# Patient Record
Sex: MALE | Race: WHITE | NOT HISPANIC OR LATINO
[De-identification: names, ages, dates, MRNs, and addresses within clinical notes are randomized per-mention and may not be internally consistent; named-entity substitution may affect disease eponyms.]

---

## 2017-01-06 ENCOUNTER — APPOINTMENT (OUTPATIENT)
Dept: CARDIOLOGY | Facility: CLINIC | Age: 82
End: 2017-01-06

## 2017-01-06 ENCOUNTER — NON-APPOINTMENT (OUTPATIENT)
Age: 82
End: 2017-01-06

## 2017-01-06 VITALS
DIASTOLIC BLOOD PRESSURE: 52 MMHG | HEART RATE: 84 BPM | TEMPERATURE: 97.4 F | HEIGHT: 67 IN | WEIGHT: 140 LBS | BODY MASS INDEX: 21.97 KG/M2 | SYSTOLIC BLOOD PRESSURE: 87 MMHG | OXYGEN SATURATION: 99 %

## 2017-01-06 DIAGNOSIS — Z87.09 PERSONAL HISTORY OF OTHER DISEASES OF THE RESPIRATORY SYSTEM: ICD-10-CM

## 2017-01-16 ENCOUNTER — NON-APPOINTMENT (OUTPATIENT)
Age: 82
End: 2017-01-16

## 2017-01-16 RX ORDER — LEVOFLOXACIN 500 MG/1
500 TABLET, FILM COATED ORAL DAILY
Qty: 5 | Refills: 1 | Status: DISCONTINUED | COMMUNITY
Start: 2017-01-06 | End: 2017-01-16

## 2017-03-06 ENCOUNTER — NON-APPOINTMENT (OUTPATIENT)
Age: 82
End: 2017-03-06

## 2017-03-06 ENCOUNTER — APPOINTMENT (OUTPATIENT)
Dept: CARDIOLOGY | Facility: CLINIC | Age: 82
End: 2017-03-06

## 2017-03-06 VITALS
SYSTOLIC BLOOD PRESSURE: 100 MMHG | BODY MASS INDEX: 22.96 KG/M2 | OXYGEN SATURATION: 100 % | WEIGHT: 146.3 LBS | DIASTOLIC BLOOD PRESSURE: 62 MMHG | HEIGHT: 67 IN

## 2017-03-06 VITALS — HEART RATE: 106 BPM

## 2017-03-07 ENCOUNTER — NON-APPOINTMENT (OUTPATIENT)
Age: 82
End: 2017-03-07

## 2017-03-28 ENCOUNTER — APPOINTMENT (OUTPATIENT)
Dept: CARDIOLOGY | Facility: CLINIC | Age: 82
End: 2017-03-28

## 2017-03-28 VITALS
DIASTOLIC BLOOD PRESSURE: 57 MMHG | BODY MASS INDEX: 22.91 KG/M2 | SYSTOLIC BLOOD PRESSURE: 89 MMHG | WEIGHT: 146 LBS | OXYGEN SATURATION: 100 % | HEIGHT: 67 IN | HEART RATE: 126 BPM

## 2017-03-29 ENCOUNTER — INPATIENT (INPATIENT)
Facility: HOSPITAL | Age: 82
LOS: 4 days | Discharge: HOME | DRG: 291 | End: 2017-04-03
Attending: INTERNAL MEDICINE | Admitting: INTERNAL MEDICINE
Payer: MEDICARE

## 2017-03-29 VITALS
SYSTOLIC BLOOD PRESSURE: 77 MMHG | HEART RATE: 111 BPM | DIASTOLIC BLOOD PRESSURE: 52 MMHG | RESPIRATION RATE: 22 BRPM | OXYGEN SATURATION: 100 % | TEMPERATURE: 97 F

## 2017-03-29 DIAGNOSIS — Z71.89 OTHER SPECIFIED COUNSELING: ICD-10-CM

## 2017-03-29 DIAGNOSIS — R06.09 OTHER FORMS OF DYSPNEA: ICD-10-CM

## 2017-03-29 DIAGNOSIS — I25.10 ATHEROSCLEROTIC HEART DISEASE OF NATIVE CORONARY ARTERY WITHOUT ANGINA PECTORIS: ICD-10-CM

## 2017-03-29 DIAGNOSIS — N17.9 ACUTE KIDNEY FAILURE, UNSPECIFIED: ICD-10-CM

## 2017-03-29 DIAGNOSIS — K56.69 OTHER INTESTINAL OBSTRUCTION: Chronic | ICD-10-CM

## 2017-03-29 DIAGNOSIS — D64.9 ANEMIA, UNSPECIFIED: ICD-10-CM

## 2017-03-29 DIAGNOSIS — R53.1 WEAKNESS: ICD-10-CM

## 2017-03-29 DIAGNOSIS — I48.2 CHRONIC ATRIAL FIBRILLATION: ICD-10-CM

## 2017-03-29 DIAGNOSIS — E03.9 HYPOTHYROIDISM, UNSPECIFIED: ICD-10-CM

## 2017-03-29 DIAGNOSIS — I50.9 HEART FAILURE, UNSPECIFIED: ICD-10-CM

## 2017-03-29 LAB
ALBUMIN SERPL ELPH-MCNC: 3.5 G/DL — SIGNIFICANT CHANGE UP (ref 3.3–5)
ALP SERPL-CCNC: 54 U/L — SIGNIFICANT CHANGE UP (ref 40–120)
ALT FLD-CCNC: 11 U/L RC — SIGNIFICANT CHANGE UP (ref 10–45)
ANION GAP SERPL CALC-SCNC: 15 MMOL/L — SIGNIFICANT CHANGE UP (ref 5–17)
APTT BLD: 43.9 SEC — HIGH (ref 27.5–37.4)
AST SERPL-CCNC: 25 U/L — SIGNIFICANT CHANGE UP (ref 10–40)
BASOPHILS # BLD AUTO: 0 K/UL — SIGNIFICANT CHANGE UP (ref 0–0.2)
BASOPHILS NFR BLD AUTO: 0.3 % — SIGNIFICANT CHANGE UP (ref 0–2)
BILIRUB SERPL-MCNC: 0.3 MG/DL — SIGNIFICANT CHANGE UP (ref 0.2–1.2)
BUN SERPL-MCNC: 82 MG/DL — HIGH (ref 7–23)
CALCIUM SERPL-MCNC: 8.4 MG/DL — SIGNIFICANT CHANGE UP (ref 8.4–10.5)
CHLORIDE SERPL-SCNC: 106 MMOL/L — SIGNIFICANT CHANGE UP (ref 96–108)
CK MB BLD-MCNC: 4.3 % — HIGH (ref 0–3.5)
CK MB CFR SERPL CALC: 4.9 NG/ML — SIGNIFICANT CHANGE UP (ref 0–6.7)
CK SERPL-CCNC: 113 U/L — SIGNIFICANT CHANGE UP (ref 30–200)
CO2 SERPL-SCNC: 22 MMOL/L — SIGNIFICANT CHANGE UP (ref 22–31)
CREAT SERPL-MCNC: 2.2 MG/DL — HIGH (ref 0.5–1.3)
EOSINOPHIL # BLD AUTO: 0 K/UL — SIGNIFICANT CHANGE UP (ref 0–0.5)
EOSINOPHIL NFR BLD AUTO: 0 % — SIGNIFICANT CHANGE UP (ref 0–6)
GAS PNL BLDV: SIGNIFICANT CHANGE UP
GLUCOSE SERPL-MCNC: 109 MG/DL — HIGH (ref 70–99)
HCT VFR BLD CALC: 21.2 % — LOW (ref 39–50)
HCT VFR BLD CALC: 22.6 % — LOW (ref 39–50)
HGB BLD-MCNC: 7.1 G/DL — LOW (ref 13–17)
HGB BLD-MCNC: 7.7 G/DL — LOW (ref 13–17)
INR BLD: 3.18 RATIO — HIGH (ref 0.88–1.16)
LYMPHOCYTES # BLD AUTO: 2 K/UL — SIGNIFICANT CHANGE UP (ref 1–3.3)
LYMPHOCYTES # BLD AUTO: 27.4 % — SIGNIFICANT CHANGE UP (ref 13–44)
MCHC RBC-ENTMCNC: 33.5 GM/DL — SIGNIFICANT CHANGE UP (ref 32–36)
MCHC RBC-ENTMCNC: 33.9 PG — SIGNIFICANT CHANGE UP (ref 27–34)
MCHC RBC-ENTMCNC: 34 GM/DL — SIGNIFICANT CHANGE UP (ref 32–36)
MCHC RBC-ENTMCNC: 34.1 PG — HIGH (ref 27–34)
MCV RBC AUTO: 100 FL — SIGNIFICANT CHANGE UP (ref 80–100)
MCV RBC AUTO: 101 FL — HIGH (ref 80–100)
MONOCYTES # BLD AUTO: 0.5 K/UL — SIGNIFICANT CHANGE UP (ref 0–0.9)
MONOCYTES NFR BLD AUTO: 7.3 % — SIGNIFICANT CHANGE UP (ref 2–14)
NEUTROPHILS # BLD AUTO: 4.6 K/UL — SIGNIFICANT CHANGE UP (ref 1.8–7.4)
NEUTROPHILS NFR BLD AUTO: 64.9 % — SIGNIFICANT CHANGE UP (ref 43–77)
NT-PROBNP SERPL-SCNC: 4975 PG/ML — HIGH (ref 0–300)
PLATELET # BLD AUTO: 169 K/UL — SIGNIFICANT CHANGE UP (ref 150–400)
PLATELET # BLD AUTO: 180 K/UL — SIGNIFICANT CHANGE UP (ref 150–400)
POTASSIUM SERPL-MCNC: 3.8 MMOL/L — SIGNIFICANT CHANGE UP (ref 3.5–5.3)
POTASSIUM SERPL-SCNC: 3.8 MMOL/L — SIGNIFICANT CHANGE UP (ref 3.5–5.3)
PROT SERPL-MCNC: 5.8 G/DL — LOW (ref 6–8.3)
PROTHROM AB SERPL-ACNC: 35.2 SEC — HIGH (ref 9.8–12.7)
RBC # BLD: 2.1 M/UL — LOW (ref 4.2–5.8)
RBC # BLD: 2.25 M/UL — LOW (ref 4.2–5.8)
RBC # FLD: 16.1 % — HIGH (ref 10.3–14.5)
RBC # FLD: 16.2 % — HIGH (ref 10.3–14.5)
SODIUM SERPL-SCNC: 143 MMOL/L — SIGNIFICANT CHANGE UP (ref 135–145)
TROPONIN T SERPL-MCNC: 0.06 NG/ML — SIGNIFICANT CHANGE UP (ref 0–0.06)
WBC # BLD: 6.4 K/UL — SIGNIFICANT CHANGE UP (ref 3.8–10.5)
WBC # BLD: 7.2 K/UL — SIGNIFICANT CHANGE UP (ref 3.8–10.5)
WBC # FLD AUTO: 6.4 K/UL — SIGNIFICANT CHANGE UP (ref 3.8–10.5)
WBC # FLD AUTO: 7.2 K/UL — SIGNIFICANT CHANGE UP (ref 3.8–10.5)

## 2017-03-29 PROCEDURE — 99223 1ST HOSP IP/OBS HIGH 75: CPT

## 2017-03-29 PROCEDURE — 93010 ELECTROCARDIOGRAM REPORT: CPT

## 2017-03-29 PROCEDURE — 71010: CPT | Mod: 26

## 2017-03-29 PROCEDURE — 99285 EMERGENCY DEPT VISIT HI MDM: CPT | Mod: 25,GC

## 2017-03-29 RX ORDER — RIVAROXABAN 15 MG-20MG
0 KIT ORAL
Qty: 0 | Refills: 0 | COMMUNITY

## 2017-03-29 RX ORDER — SIMVASTATIN 20 MG/1
20 TABLET, FILM COATED ORAL AT BEDTIME
Qty: 0 | Refills: 0 | Status: DISCONTINUED | OUTPATIENT
Start: 2017-03-29 | End: 2017-04-03

## 2017-03-29 RX ORDER — AZITHROMYCIN 500 MG/1
500 TABLET, FILM COATED ORAL ONCE
Qty: 0 | Refills: 0 | Status: DISCONTINUED | OUTPATIENT
Start: 2017-03-29 | End: 2017-03-29

## 2017-03-29 RX ORDER — SODIUM CHLORIDE 9 MG/ML
500 INJECTION INTRAMUSCULAR; INTRAVENOUS; SUBCUTANEOUS
Qty: 0 | Refills: 0 | Status: DISCONTINUED | OUTPATIENT
Start: 2017-03-29 | End: 2017-03-29

## 2017-03-29 RX ORDER — SIMVASTATIN 20 MG/1
0 TABLET, FILM COATED ORAL
Qty: 0 | Refills: 0 | COMMUNITY

## 2017-03-29 RX ORDER — FUROSEMIDE 40 MG
1 TABLET ORAL
Qty: 0 | Refills: 0 | COMMUNITY

## 2017-03-29 RX ORDER — CARVEDILOL PHOSPHATE 80 MG/1
0 CAPSULE, EXTENDED RELEASE ORAL
Qty: 0 | Refills: 0 | COMMUNITY

## 2017-03-29 RX ORDER — FINASTERIDE 5 MG/1
0 TABLET, FILM COATED ORAL
Qty: 0 | Refills: 0 | COMMUNITY

## 2017-03-29 RX ORDER — LEVOTHYROXINE SODIUM 125 MCG
1 TABLET ORAL
Qty: 0 | Refills: 0 | COMMUNITY

## 2017-03-29 RX ORDER — LEVOTHYROXINE SODIUM 125 MCG
0 TABLET ORAL
Qty: 0 | Refills: 0 | COMMUNITY

## 2017-03-29 RX ORDER — FUROSEMIDE 40 MG
2.5 TABLET ORAL
Qty: 500 | Refills: 0 | Status: DISCONTINUED | OUTPATIENT
Start: 2017-03-29 | End: 2017-03-30

## 2017-03-29 RX ORDER — FUROSEMIDE 40 MG
60 TABLET ORAL ONCE
Qty: 0 | Refills: 0 | Status: DISCONTINUED | OUTPATIENT
Start: 2017-03-29 | End: 2017-03-29

## 2017-03-29 RX ORDER — VALSARTAN 80 MG/1
0 TABLET ORAL
Qty: 0 | Refills: 0 | COMMUNITY

## 2017-03-29 RX ORDER — LEVOTHYROXINE SODIUM 125 MCG
150 TABLET ORAL DAILY
Qty: 0 | Refills: 0 | Status: DISCONTINUED | OUTPATIENT
Start: 2017-03-29 | End: 2017-04-03

## 2017-03-29 RX ORDER — VALSARTAN 80 MG/1
1 TABLET ORAL
Qty: 0 | Refills: 0 | COMMUNITY

## 2017-03-29 RX ORDER — FUROSEMIDE 40 MG
40 TABLET ORAL ONCE
Qty: 0 | Refills: 0 | Status: COMPLETED | OUTPATIENT
Start: 2017-03-29 | End: 2017-03-29

## 2017-03-29 RX ORDER — FUROSEMIDE 40 MG
40 TABLET ORAL
Qty: 0 | Refills: 0 | Status: DISCONTINUED | OUTPATIENT
Start: 2017-03-29 | End: 2017-03-29

## 2017-03-29 RX ORDER — CARVEDILOL PHOSPHATE 80 MG/1
3.12 CAPSULE, EXTENDED RELEASE ORAL EVERY 12 HOURS
Qty: 0 | Refills: 0 | Status: DISCONTINUED | OUTPATIENT
Start: 2017-03-29 | End: 2017-03-29

## 2017-03-29 RX ORDER — SIMVASTATIN 20 MG/1
1 TABLET, FILM COATED ORAL
Qty: 0 | Refills: 0 | COMMUNITY

## 2017-03-29 RX ORDER — SODIUM CHLORIDE 9 MG/ML
3 INJECTION INTRAMUSCULAR; INTRAVENOUS; SUBCUTANEOUS ONCE
Qty: 0 | Refills: 0 | Status: COMPLETED | OUTPATIENT
Start: 2017-03-29 | End: 2017-03-29

## 2017-03-29 RX ORDER — CEFTRIAXONE 500 MG/1
1 INJECTION, POWDER, FOR SOLUTION INTRAMUSCULAR; INTRAVENOUS ONCE
Qty: 0 | Refills: 0 | Status: COMPLETED | OUTPATIENT
Start: 2017-03-29 | End: 2017-03-29

## 2017-03-29 RX ADMIN — CEFTRIAXONE 100 GRAM(S): 500 INJECTION, POWDER, FOR SOLUTION INTRAMUSCULAR; INTRAVENOUS at 17:13

## 2017-03-29 RX ADMIN — Medication 40 MILLIGRAM(S): at 14:53

## 2017-03-29 RX ADMIN — SODIUM CHLORIDE 3 MILLILITER(S): 9 INJECTION INTRAMUSCULAR; INTRAVENOUS; SUBCUTANEOUS at 14:43

## 2017-03-29 NOTE — ED ADULT NURSE NOTE - PMH
Chronic atrial fibrillation    Chronic kidney disease, unspecified stage    Chronic systolic congestive heart failure    Coronary artery disease involving native coronary artery of native heart without angina pectoris    Essential hypertension    Hyperlipidemia, unspecified hyperlipidemia type    Hypothyroidism, unspecified type

## 2017-03-29 NOTE — H&P ADULT. - PROBLEM SELECTOR PLAN 3
last recorded Cr of 1.73 from 12/2016. currently with NEIDA on CKD- likely secondary to poor perfusion/forward flow 2/2 ADHF. will continue IV diuresis and monitor Cr closely  -check UA  -renally dose meds, avoid nephrotoxins  -monitor Cr closely with diuresis suspect 2/2 ADHF, anemia.Work up for both as above. Unlikely 2/2 hypothryodisim. will check TSH to assess for compliance of synthroid.   -PT consult  -nutrition consult suspect 2/2 ADHF, anemia.Work up for both as above. Unlikely 2/2 hypothryodisim. will check TSH to assess for compliance of synthroid.   -PT consult  -nutrition consult  -low suspicion for PNA- stop antibiotics at this time. (no fever, no cough, no leukocytosis)

## 2017-03-29 NOTE — ED PROVIDER NOTE - MEDICAL DECISION MAKING DETAILS
[age] yo male presents with sob worse with exertion; denies cough; hx of chf on furosemide; on exam decreased bs at bases, 2+ bilateral pitting edema; concern for chf exacerbation; r/o infection, pleural effusion, pna; Plan: cxr, labs, ekg, bnp, vbg, admission

## 2017-03-29 NOTE — ED PROVIDER NOTE - OBJECTIVE STATEMENT
89 y/o M w/ Hx of CHF, HTN, HLD, on xarelto (not sure why), p/w SOB.   PMHx: CHF, HTN, HLD, Hypothyroid  primary care physician: Benjamin Serrano 89 y/o M w/ Hx of CHF, HTN, HLD, on xarelto (not sure why), p/w SOB.   PMHx: CHF, HTN, HLD, Hypothyroid  PSHx: SBO  primary care physician: Benjamin Serrano 89 y/o M w/ Hx of CHF, HTN, HLD, on xarelto (not sure why), p/w worsening LAZO over the last 2 months. Denies CP, cough, fever, n/v/d.   PMHx: CHF, HTN, HLD, Hypothyroid  PSHx: SBO  primary care physician: Benjamin Serrano

## 2017-03-29 NOTE — H&P ADULT. - PROBLEM SELECTOR PLAN 8
discussed GOC with patient-- has never thought about DNR/DNI before. He currently would like everything done and remain full code. He reports his oldest daughter will likely be his HCP-- she is coming to hospital tonight vs tomorrow AM.

## 2017-03-29 NOTE — H&P ADULT. - PROBLEM SELECTOR PLAN 5
currently rate controlled 100-110. Will continue home meds for rate control with holding parameters. holding anticoagulation at this time  -diovan 160mg daily  -coreg 3/125mg q12h on synthroid. will check TSH to assess for compliance

## 2017-03-29 NOTE — ED ADULT NURSE NOTE - CHPI ED SYMPTOMS NEG
no vomiting/no nausea/no diaphoresis/no back pain/no chills/no cough/no fever/no syncope/no dizziness

## 2017-03-29 NOTE — H&P ADULT. - PROBLEM SELECTOR PLAN 7
discussed GOC with patient-- has never thought about DNR/DNI before. He currently would like everything done and remain full code. He reports his oldest daughter will likely be his HCP-- she is coming to hospital tonight vs tomorrow AM. no chest pain, stable currently. continue lipitor, will hold aspirin at this time given signifcant anemia

## 2017-03-29 NOTE — H&P ADULT. - EXTREMITIES COMMENTS
4+ pitting edema  extremities warm no cyanosis  small ulcerations of lateral shins  wrapped in DSD.  no surrounding erythema, or signs of infection

## 2017-03-29 NOTE — H&P ADULT. - PROBLEM SELECTOR PROBLEM 7
Advanced care planning/counseling discussion Coronary artery disease involving native coronary artery of native heart without angina pectoris

## 2017-03-29 NOTE — ED ADULT NURSE NOTE - OBJECTIVE STATEMENT
Per pt and pt's neighbor's report, pt has had sob for the past two month.  He has pitting edema up to his hips and weeping from the edema in his ankles.  Pt is A&Ox4, skin is warm and dry, and pt is able to speak in full sentences but becomes sob with any exertion.  Pt denies cp, dizziness, lightheadedness, nausea, vomiting, fever, or chills.

## 2017-03-29 NOTE — ED ADULT NURSE REASSESSMENT NOTE - NS ED NURSE REASSESS COMMENT FT1
Pt is a&ox4, waiting for room disposition, no reports of sob at this time, IV site clear no redness or swelling.

## 2017-03-29 NOTE — H&P ADULT. - PROBLEM SELECTOR PLAN 6
no chest pain, stable currently. continue lipitor, will hold aspirin at this time given signifcant anemia currently rate controlled 100-110. Will continue home meds for rate control with holding parameters. holding anticoagulation at this time  -diovan 160mg daily  -coreg 3/125mg q12h currently rate controlled 100-110. Will continue home meds for rate control with holding parameters. holding anticoagulation at this time  -coreg 3.125mg q12h

## 2017-03-29 NOTE — H&P ADULT. - HISTORY OF PRESENT ILLNESS
89 yo M with PMH of sCHF, HTN/HLD, CAD ?s/p stent, afib on xarelto presenting with significant body swelling, generalized weakness, and worsening dyspnea on exertion for the past several months. Pt notes swelling in his legs up to the level of his hips, scrotal edema, orthopnea, and feeling sob on minimal exertion. At baseline he walks around his apartment daily, and goes downstairs to his neighbors apartment and back up for exercise, however more recently he finds these activities increasingly difficult. Pt has been feeling generally weak, sleeping more, and eating less. Pt reports three weeks ago he saw his cardiologist Dr. Serrano, who increased his water pill from once daily to 1.5 pills daily to help get more fluid out of him, however he has not noticed an improvement. Yesterday at his cardiologists office he was found to be hypotensive, tachycardic, with 3-4+ pitting edema, and was therefor sent into the hospital for management of ADHF requiring IV diuresis.   Of note, pt was recently diagnosed with atrial fibrillation and started on xarelto within the last several months- per patient since starting xarelto he has noticed worsening bruising of his arms, as well as occasional bright red blood on the toilet paper when he wipes after a bowel movement. Also noting occasional dark stools. Denies prior hx of GI Bleeding.     VS in ED:   T 97.1, BP 94/66, R 20, P 106-111 (afib ), O2 100% RA  pt given 40mg IV lasix x 1 , ceftriaxone, azithromycin   pt guaiac negative in ED

## 2017-03-29 NOTE — ED PROVIDER NOTE - PROGRESS NOTE DETAILS
Ana w/ Janes Sanches ([phone]), partner of pt's primary care physician, Dr. Serrano, recommended admission (to [facility], if not then hospitalist) for worsening HF. States that pt needs aggressive diuresis. MD Schwartz:  patient signed out to me by Dr. Brasher.  89 yo M, clinical evidence of fluid overload.  Plan:  diurese, admit to Dr. [facility] or hospitalist.  Patient admitted.

## 2017-03-29 NOTE — ED PROVIDER NOTE - PHYSICAL EXAMINATION
Gen: NAD  Eyes:  sclerae white, no icterus  ENT: Moist mucous membranes. No exudates  Neck: supple, no LAD, mass or goiter, trachea midline  CV: Irregularly irregular  Pulm: Decreased breath sounds at LLL  Abd: BS+, nondistended, No tenderness to palpation  Musculoskeletal:  4+ b/l LE edema  Psych: mood good, affect full range and congruent with mood.  Neurologic: AAOx3 Gen: NAD  Eyes:  sclerae white, no icterus  ENT: Moist mucous membranes. No exudates  Neck: supple, no LAD, mass or goiter, trachea midline  CV: Irregularly irregular  Pulm: Decreased breath sounds at LLL  Abd: BS+, nondistended, No tenderness to palpation  Rectal: Guaic neg  Musculoskeletal:  4+ b/l LE edema  Psych: mood good, affect full range and congruent with mood.  Neurologic: AAOx3

## 2017-03-29 NOTE — H&P ADULT. - PROBLEM SELECTOR PROBLEM 6
Coronary artery disease involving native coronary artery of native heart without angina pectoris Chronic atrial fibrillation

## 2017-03-29 NOTE — H&P ADULT. - PROBLEM SELECTOR PLAN 2
patient with baseline hemoglobin of 10.9 (9/2016 outpatient), now with hgb of 7.1. although guaiac negative per ED rectal examination, concern for underlying GI bleeding given reported hx of BRBPR, hx of recently starting anti-coagulants.  -hold xarelto, hold aspirin  -check occult blood  -repeat  cbc at 9pm, will try and withold transfusion given ADHF, but if hgb <7 pt will need transfusion  -consider GI consult  -check anemia workup- iron studies, ferritin, B12 in AM

## 2017-03-29 NOTE — H&P ADULT. - NOTES
lives in co-op. close with neighbor who lives downstairs. has 2 daughters live in other states. no HHA.

## 2017-03-29 NOTE — H&P ADULT. - RESPIRATORY COMMENTS
tachypnea without accessory muscle use, speaks in full sentences without signs of distress, decreased breath sounds at bases with normal air movement mid lung to apices. no crackles, rhonchi, wheezing appreciated.

## 2017-03-29 NOTE — H&P ADULT. - PROBLEM SELECTOR PLAN 4
on synthroid. will check TSH to assess for compliance last recorded Cr of 1.73 from 12/2016. currently with NEIDA on CKD- likely secondary to poor perfusion/forward flow 2/2 ADHF. will continue IV diuresis and monitor Cr closely  -check UA  -renally dose meds, avoid nephrotoxins  -monitor Cr closely with diuresis last recorded Cr of 1.73 from 12/2016. currently with NEIDA on CKD- likely secondary to poor perfusion/forward flow 2/2 ADHF. will continue IV diuresis and monitor Cr closely  -check UA  -renally dose meds, avoid nephrotoxins  -monitor Cr closely with diuresis  -hold ARB

## 2017-03-29 NOTE — H&P ADULT. - PROBLEM SELECTOR PLAN 1
worsening sob on exertion, significant 4+ LE edema, bilateral pleural effusions on CXR, and elevated BNP all consistent with acute decompensated CHF. pt sent by cardiologist Dr. Serrano for IV diuresis.  -lasix 40mg IV BID-- responded to first dose in ER well with good urine output (per patient and family). Blood pressure relatively low, however patient maintaining adequate MAP >65. Mentating well.  -daily weight, strict I&Os  -cardiology/CHF consult  -TTE

## 2017-03-29 NOTE — H&P ADULT. - ASSESSMENT
89 yo M with PMH of sCHF, HTN/HLD, CAD ?s/p stent, afib on xarelto presenting with significant LE edema, generalized weakness, and worsening dyspnea on exertion for the past several months secondary to ADHF and likely symptomatic anemia

## 2017-03-30 LAB
ALBUMIN SERPL ELPH-MCNC: 2.9 G/DL — LOW (ref 3.3–5)
ALBUMIN SERPL ELPH-MCNC: 3 G/DL — LOW (ref 3.3–5)
ALBUMIN SERPL ELPH-MCNC: 3.4 G/DL — SIGNIFICANT CHANGE UP (ref 3.3–5)
ALP SERPL-CCNC: 54 U/L — SIGNIFICANT CHANGE UP (ref 40–120)
ALP SERPL-CCNC: 54 U/L — SIGNIFICANT CHANGE UP (ref 40–120)
ALP SERPL-CCNC: 58 U/L — SIGNIFICANT CHANGE UP (ref 40–120)
ALT FLD-CCNC: 10 U/L RC — SIGNIFICANT CHANGE UP (ref 10–45)
ALT FLD-CCNC: 12 U/L RC — SIGNIFICANT CHANGE UP (ref 10–45)
ALT FLD-CCNC: 13 U/L RC — SIGNIFICANT CHANGE UP (ref 10–45)
ANION GAP SERPL CALC-SCNC: 12 MMOL/L — SIGNIFICANT CHANGE UP (ref 5–17)
ANION GAP SERPL CALC-SCNC: 19 MMOL/L — HIGH (ref 5–17)
ANION GAP SERPL CALC-SCNC: 19 MMOL/L — HIGH (ref 5–17)
APTT BLD: 38.8 SEC — HIGH (ref 27.5–37.4)
APTT BLD: 41.2 SEC — HIGH (ref 27.5–37.4)
AST SERPL-CCNC: 17 U/L — SIGNIFICANT CHANGE UP (ref 10–40)
AST SERPL-CCNC: 20 U/L — SIGNIFICANT CHANGE UP (ref 10–40)
AST SERPL-CCNC: 22 U/L — SIGNIFICANT CHANGE UP (ref 10–40)
BILIRUB SERPL-MCNC: 0.3 MG/DL — SIGNIFICANT CHANGE UP (ref 0.2–1.2)
BILIRUB SERPL-MCNC: 0.3 MG/DL — SIGNIFICANT CHANGE UP (ref 0.2–1.2)
BILIRUB SERPL-MCNC: 0.5 MG/DL — SIGNIFICANT CHANGE UP (ref 0.2–1.2)
BLD GP AB SCN SERPL QL: NEGATIVE — SIGNIFICANT CHANGE UP
BUN SERPL-MCNC: 75 MG/DL — HIGH (ref 7–23)
BUN SERPL-MCNC: 76 MG/DL — HIGH (ref 7–23)
BUN SERPL-MCNC: 80 MG/DL — HIGH (ref 7–23)
CALCIUM SERPL-MCNC: 8.4 MG/DL — SIGNIFICANT CHANGE UP (ref 8.4–10.5)
CALCIUM SERPL-MCNC: 8.5 MG/DL — SIGNIFICANT CHANGE UP (ref 8.4–10.5)
CALCIUM SERPL-MCNC: 8.6 MG/DL — SIGNIFICANT CHANGE UP (ref 8.4–10.5)
CALCIUM UR-MCNC: 4 MG/DL — SIGNIFICANT CHANGE UP
CHLORIDE SERPL-SCNC: 106 MMOL/L — SIGNIFICANT CHANGE UP (ref 96–108)
CHLORIDE SERPL-SCNC: 106 MMOL/L — SIGNIFICANT CHANGE UP (ref 96–108)
CHLORIDE SERPL-SCNC: 107 MMOL/L — SIGNIFICANT CHANGE UP (ref 96–108)
CHLORIDE UR-SCNC: 90 MMOL/L — SIGNIFICANT CHANGE UP
CHOLEST SERPL-MCNC: 99 MG/DL — SIGNIFICANT CHANGE UP (ref 10–199)
CK MB BLD-MCNC: 5.2 % — HIGH (ref 0–3.5)
CK MB CFR SERPL CALC: 5.5 NG/ML — SIGNIFICANT CHANGE UP (ref 0–6.7)
CK SERPL-CCNC: 105 U/L — SIGNIFICANT CHANGE UP (ref 30–200)
CO2 SERPL-SCNC: 20 MMOL/L — LOW (ref 22–31)
CO2 SERPL-SCNC: 20 MMOL/L — LOW (ref 22–31)
CO2 SERPL-SCNC: 24 MMOL/L — SIGNIFICANT CHANGE UP (ref 22–31)
CREAT ?TM UR-MCNC: 27 MG/DL — SIGNIFICANT CHANGE UP
CREAT SERPL-MCNC: 2.04 MG/DL — HIGH (ref 0.5–1.3)
CREAT SERPL-MCNC: 2.09 MG/DL — HIGH (ref 0.5–1.3)
CREAT SERPL-MCNC: 2.23 MG/DL — HIGH (ref 0.5–1.3)
FERRITIN SERPL-MCNC: 53.2 NG/ML — SIGNIFICANT CHANGE UP (ref 30–400)
GLUCOSE SERPL-MCNC: 102 MG/DL — HIGH (ref 70–99)
GLUCOSE SERPL-MCNC: 116 MG/DL — HIGH (ref 70–99)
GLUCOSE SERPL-MCNC: 119 MG/DL — HIGH (ref 70–99)
HBA1C BLD-MCNC: 5.6 % — SIGNIFICANT CHANGE UP (ref 4–5.6)
HCT VFR BLD CALC: 21.7 % — LOW (ref 39–50)
HCT VFR BLD CALC: 25.2 % — LOW (ref 39–50)
HDLC SERPL-MCNC: 38 MG/DL — LOW (ref 40–125)
HGB BLD-MCNC: 7.5 G/DL — LOW (ref 13–17)
HGB BLD-MCNC: 8.4 G/DL — LOW (ref 13–17)
INR BLD: 1.91 RATIO — HIGH (ref 0.88–1.16)
INR BLD: 2.05 RATIO — HIGH (ref 0.88–1.16)
IRON SATN MFR SERPL: 24 UG/DL — LOW (ref 45–165)
IRON SATN MFR SERPL: 9 % — LOW (ref 16–55)
LIPID PNL WITH DIRECT LDL SERPL: 42 MG/DL — SIGNIFICANT CHANGE UP
MAGNESIUM SERPL-MCNC: 1.9 MG/DL — SIGNIFICANT CHANGE UP (ref 1.6–2.6)
MAGNESIUM SERPL-MCNC: 2.1 MG/DL — SIGNIFICANT CHANGE UP (ref 1.6–2.6)
MAGNESIUM SERPL-MCNC: 2.1 MG/DL — SIGNIFICANT CHANGE UP (ref 1.6–2.6)
MAGNESIUM UR-MCNC: 3.2 MG/DL — SIGNIFICANT CHANGE UP
MCHC RBC-ENTMCNC: 32.8 PG — SIGNIFICANT CHANGE UP (ref 27–34)
MCHC RBC-ENTMCNC: 33.4 GM/DL — SIGNIFICANT CHANGE UP (ref 32–36)
MCHC RBC-ENTMCNC: 34.5 GM/DL — SIGNIFICANT CHANGE UP (ref 32–36)
MCHC RBC-ENTMCNC: 34.7 PG — HIGH (ref 27–34)
MCV RBC AUTO: 101 FL — HIGH (ref 80–100)
MCV RBC AUTO: 98.2 FL — SIGNIFICANT CHANGE UP (ref 80–100)
OSMOLALITY UR: 371 MOS/KG — SIGNIFICANT CHANGE UP (ref 300–900)
PHOSPHATE SERPL-MCNC: 3.7 MG/DL — SIGNIFICANT CHANGE UP (ref 2.5–4.5)
PHOSPHATE SERPL-MCNC: 3.8 MG/DL — SIGNIFICANT CHANGE UP (ref 2.5–4.5)
PHOSPHATE SERPL-MCNC: 3.9 MG/DL — SIGNIFICANT CHANGE UP (ref 2.5–4.5)
PLATELET # BLD AUTO: 160 K/UL — SIGNIFICANT CHANGE UP (ref 150–400)
PLATELET # BLD AUTO: 179 K/UL — SIGNIFICANT CHANGE UP (ref 150–400)
POTASSIUM SERPL-MCNC: 3.5 MMOL/L — SIGNIFICANT CHANGE UP (ref 3.5–5.3)
POTASSIUM SERPL-MCNC: 3.6 MMOL/L — SIGNIFICANT CHANGE UP (ref 3.5–5.3)
POTASSIUM SERPL-MCNC: 3.7 MMOL/L — SIGNIFICANT CHANGE UP (ref 3.5–5.3)
POTASSIUM SERPL-SCNC: 3.5 MMOL/L — SIGNIFICANT CHANGE UP (ref 3.5–5.3)
POTASSIUM SERPL-SCNC: 3.6 MMOL/L — SIGNIFICANT CHANGE UP (ref 3.5–5.3)
POTASSIUM SERPL-SCNC: 3.7 MMOL/L — SIGNIFICANT CHANGE UP (ref 3.5–5.3)
POTASSIUM UR-SCNC: 14 MMOL/L — SIGNIFICANT CHANGE UP
PROT SERPL-MCNC: 5.5 G/DL — LOW (ref 6–8.3)
PROT SERPL-MCNC: 5.6 G/DL — LOW (ref 6–8.3)
PROT SERPL-MCNC: 6.2 G/DL — SIGNIFICANT CHANGE UP (ref 6–8.3)
PROTHROM AB SERPL-ACNC: 20.9 SEC — HIGH (ref 9.8–12.7)
PROTHROM AB SERPL-ACNC: 22.5 SEC — HIGH (ref 9.8–12.7)
RBC # BLD: 2.16 M/UL — LOW (ref 4.2–5.8)
RBC # BLD: 2.57 M/UL — LOW (ref 4.2–5.8)
RBC # FLD: 15.9 % — HIGH (ref 10.3–14.5)
RBC # FLD: 16.1 % — HIGH (ref 10.3–14.5)
RH IG SCN BLD-IMP: POSITIVE — SIGNIFICANT CHANGE UP
SODIUM SERPL-SCNC: 142 MMOL/L — SIGNIFICANT CHANGE UP (ref 135–145)
SODIUM SERPL-SCNC: 145 MMOL/L — SIGNIFICANT CHANGE UP (ref 135–145)
SODIUM SERPL-SCNC: 146 MMOL/L — HIGH (ref 135–145)
TIBC SERPL-MCNC: 259 UG/DL — SIGNIFICANT CHANGE UP (ref 220–430)
TOTAL CHOLESTEROL/HDL RATIO MEASUREMENT: 2.6 RATIO — LOW (ref 3.4–9.6)
TRIGL SERPL-MCNC: 96 MG/DL — SIGNIFICANT CHANGE UP (ref 10–149)
TROPONIN T SERPL-MCNC: 0.07 NG/ML — HIGH (ref 0–0.06)
TSH SERPL-MCNC: 29.18 UIU/ML — HIGH (ref 0.27–4.2)
UIBC SERPL-MCNC: 235 UG/DL — SIGNIFICANT CHANGE UP (ref 110–370)
UUN UR-MCNC: 377 MG/DL — SIGNIFICANT CHANGE UP
VIT B12 SERPL-MCNC: 742 PG/ML — SIGNIFICANT CHANGE UP (ref 243–894)
WBC # BLD: 6 K/UL — SIGNIFICANT CHANGE UP (ref 3.8–10.5)
WBC # BLD: 6.1 K/UL — SIGNIFICANT CHANGE UP (ref 3.8–10.5)
WBC # FLD AUTO: 6 K/UL — SIGNIFICANT CHANGE UP (ref 3.8–10.5)
WBC # FLD AUTO: 6.1 K/UL — SIGNIFICANT CHANGE UP (ref 3.8–10.5)

## 2017-03-30 PROCEDURE — 99291 CRITICAL CARE FIRST HOUR: CPT

## 2017-03-30 PROCEDURE — 99233 SBSQ HOSP IP/OBS HIGH 50: CPT

## 2017-03-30 PROCEDURE — 93306 TTE W/DOPPLER COMPLETE: CPT | Mod: 26

## 2017-03-30 PROCEDURE — 99222 1ST HOSP IP/OBS MODERATE 55: CPT | Mod: GC

## 2017-03-30 PROCEDURE — 93010 ELECTROCARDIOGRAM REPORT: CPT

## 2017-03-30 RX ORDER — POTASSIUM CHLORIDE 20 MEQ
40 PACKET (EA) ORAL ONCE
Qty: 0 | Refills: 0 | Status: COMPLETED | OUTPATIENT
Start: 2017-03-30 | End: 2017-03-30

## 2017-03-30 RX ORDER — ACETAMINOPHEN 500 MG
650 TABLET ORAL ONCE
Qty: 0 | Refills: 0 | Status: COMPLETED | OUTPATIENT
Start: 2017-03-30 | End: 2017-03-30

## 2017-03-30 RX ORDER — PANTOPRAZOLE SODIUM 20 MG/1
40 TABLET, DELAYED RELEASE ORAL
Qty: 0 | Refills: 0 | Status: DISCONTINUED | OUTPATIENT
Start: 2017-03-30 | End: 2017-04-03

## 2017-03-30 RX ORDER — MAGNESIUM SULFATE 500 MG/ML
2 VIAL (ML) INJECTION ONCE
Qty: 0 | Refills: 0 | Status: COMPLETED | OUTPATIENT
Start: 2017-03-30 | End: 2017-03-30

## 2017-03-30 RX ORDER — FUROSEMIDE 40 MG
5 TABLET ORAL
Qty: 500 | Refills: 0 | Status: DISCONTINUED | OUTPATIENT
Start: 2017-03-30 | End: 2017-03-31

## 2017-03-30 RX ORDER — DIGOXIN 250 MCG
0.25 TABLET ORAL EVERY 6 HOURS
Qty: 0 | Refills: 0 | Status: DISCONTINUED | OUTPATIENT
Start: 2017-03-30 | End: 2017-03-30

## 2017-03-30 RX ORDER — FUROSEMIDE 40 MG
40 TABLET ORAL ONCE
Qty: 0 | Refills: 0 | Status: COMPLETED | OUTPATIENT
Start: 2017-03-30 | End: 2017-03-30

## 2017-03-30 RX ORDER — LIDOCAINE 4 G/100G
1 CREAM TOPICAL ONCE
Qty: 0 | Refills: 0 | Status: COMPLETED | OUTPATIENT
Start: 2017-03-30 | End: 2017-03-30

## 2017-03-30 RX ORDER — WARFARIN SODIUM 2.5 MG/1
5 TABLET ORAL ONCE
Qty: 0 | Refills: 0 | Status: DISCONTINUED | OUTPATIENT
Start: 2017-03-30 | End: 2017-03-30

## 2017-03-30 RX ADMIN — Medication 0.25 MILLIGRAM(S): at 05:34

## 2017-03-30 RX ADMIN — LIDOCAINE 1 APPLICATION(S): 4 CREAM TOPICAL at 01:26

## 2017-03-30 RX ADMIN — Medication 650 MILLIGRAM(S): at 04:14

## 2017-03-30 RX ADMIN — Medication 2.5 MG/HR: at 21:30

## 2017-03-30 RX ADMIN — Medication 1.25 MG/HR: at 00:34

## 2017-03-30 RX ADMIN — Medication 150 MICROGRAM(S): at 05:34

## 2017-03-30 RX ADMIN — PANTOPRAZOLE SODIUM 40 MILLIGRAM(S): 20 TABLET, DELAYED RELEASE ORAL at 16:04

## 2017-03-30 RX ADMIN — Medication 0.25 MILLIGRAM(S): at 00:41

## 2017-03-30 RX ADMIN — SIMVASTATIN 20 MILLIGRAM(S): 20 TABLET, FILM COATED ORAL at 21:30

## 2017-03-30 RX ADMIN — Medication 40 MILLIGRAM(S): at 15:21

## 2017-03-30 RX ADMIN — Medication 650 MILLIGRAM(S): at 04:44

## 2017-03-30 RX ADMIN — Medication 50 GRAM(S): at 18:41

## 2017-03-30 RX ADMIN — Medication 40 MILLIEQUIVALENT(S): at 18:46

## 2017-03-30 NOTE — PHYSICAL THERAPY INITIAL EVALUATION ADULT - PLANNED THERAPY INTERVENTIONS, PT EVAL
balance training/bed mobility training/gait training/postural re-education/transfer training/strengthening

## 2017-03-30 NOTE — DIETITIAN INITIAL EVALUATION ADULT. - PROBLEM SELECTOR PLAN 6
currently rate controlled 100-110. Will continue home meds for rate control with holding parameters. holding anticoagulation at this time  -coreg 3.125mg q12h

## 2017-03-30 NOTE — PHYSICAL THERAPY INITIAL EVALUATION ADULT - GENERAL OBSERVATIONS, REHAB EVAL
Pt received semi-supine in bed s/p blood transfusion, (+) foss, (+) PICU monitoring. Pt's daughter at bedside. Pt alert, pleasant, agreeable to PT eval.

## 2017-03-30 NOTE — PHYSICAL THERAPY INITIAL EVALUATION ADULT - PERTINENT HX OF CURRENT PROBLEM, REHAB EVAL
89 yo M with PMH of sCHF, HTN/HLD, CAD ?s/p stent, afib on xarelto presenting with significant LE edema, generalized weakness, and worsening dyspnea on exertion for the past several months secondary to ADHF and likely symptomatic anemia 89 yo M with PMH of sCHF, HTN/HLD, CAD ?s/p stent, afib on xarelto presenting with significant LE edema, generalized weakness, and worsening dyspnea on exertion for the past several months secondary to ADHF and likely symptomatic anemia. CXR (3/29): B pleural effusion.

## 2017-03-30 NOTE — DIETITIAN INITIAL EVALUATION ADULT. - NS AS NUTRI INTERV MEALS SNACK
General/healthful diet/Reviewed alternate menu options and menu ordering procedure.  Obtain and honor food preferences.  Assist and encourage adequate po intakes at meals.

## 2017-03-30 NOTE — DIETITIAN INITIAL EVALUATION ADULT. - PROBLEM SELECTOR PLAN 7
no chest pain, stable currently. continue lipitor, will hold aspirin at this time given signifcant anemia

## 2017-03-30 NOTE — DIETITIAN INITIAL EVALUATION ADULT. - ENERGY NEEDS
Height:  5' 7"      Weight:  135 lbs (UBW)    BMI: 21.1 kg/m2      IBW: 148 lbs  (+/- 10%)    % IBW: 91 %      Edema: 4+ dependent edema on admission => 2+  Skin: no impairment    BM:  last BM 3/28/17 per patient    Other pertinent information: Patient presented with B/L LE edema and worsening dyspnea.  Admitted with systolic heart failure, anemia, NEIDA and generalized weakness.  PMH includes CHF, HTN, HLD, CAD, A Fib.  Anorexia noted.

## 2017-03-30 NOTE — DIETITIAN INITIAL EVALUATION ADULT. - FACTORS AFF FOOD INTAKE
change in sense of smell or taste/Patient reports he has some decrease in his ability to taste foods.

## 2017-03-30 NOTE — DIETITIAN INITIAL EVALUATION ADULT. - PROBLEM SELECTOR PLAN 3
suspect 2/2 ADHF, anemia.Work up for both as above. Unlikely 2/2 hypothryodisim. will check TSH to assess for compliance of synthroid.   -PT consult  -nutrition consult  -low suspicion for PNA- stop antibiotics at this time. (no fever, no cough, no leukocytosis)

## 2017-03-30 NOTE — PHYSICAL THERAPY INITIAL EVALUATION ADULT - ADDITIONAL COMMENTS
Pt lives alone in home with 13 stairs to enter (+) HR, no stairs within. Pt reports hx of 3 falls, states he furniture surfs in home, has hand held assist for ambulation in community. Pt owns rolling walker, 3-wheel rollator.

## 2017-03-30 NOTE — DIETITIAN INITIAL EVALUATION ADULT. - ORAL INTAKE PTA
fair/Patient lives alone and reports that he eats 3 meals daily.  Typically he will eat waffles or Turkish toast with coffee at breakfast, one serving of Boost with a cookie at lunch and meatloaf/stuffed abbage with starch and vegetable at dinner.  Patient's dinner meal is prepared by his neighbor.  Daughter reports that patient consumes smaller portions. Patient lives alone and reports that he eats 3 meals daily.  Typically he will eat waffles or Irish toast with coffee at breakfast, one serving of Boost with a cookie at lunch and meatloaf/stuffed cabbage with starch and vegetable at dinner.  Patient's dinner meal is prepared by his neighbor.  Daughter reports that patient consumes smaller portions./fair

## 2017-03-30 NOTE — DIETITIAN INITIAL EVALUATION ADULT. - PROBLEM SELECTOR PLAN 4
last recorded Cr of 1.73 from 12/2016. currently with NEIDA on CKD- likely secondary to poor perfusion/forward flow 2/2 ADHF. will continue IV diuresis and monitor Cr closely  -check UA  -renally dose meds, avoid nephrotoxins  -monitor Cr closely with diuresis  -hold ARB

## 2017-03-31 ENCOUNTER — TRANSCRIPTION ENCOUNTER (OUTPATIENT)
Age: 82
End: 2017-03-31

## 2017-03-31 LAB
ALBUMIN SERPL ELPH-MCNC: 3 G/DL — LOW (ref 3.3–5)
ALP SERPL-CCNC: 56 U/L — SIGNIFICANT CHANGE UP (ref 40–120)
ALT FLD-CCNC: 12 U/L RC — SIGNIFICANT CHANGE UP (ref 10–45)
ANION GAP SERPL CALC-SCNC: 14 MMOL/L — SIGNIFICANT CHANGE UP (ref 5–17)
APTT BLD: 34.3 SEC — SIGNIFICANT CHANGE UP (ref 27.5–37.4)
AST SERPL-CCNC: 21 U/L — SIGNIFICANT CHANGE UP (ref 10–40)
BILIRUB SERPL-MCNC: 0.3 MG/DL — SIGNIFICANT CHANGE UP (ref 0.2–1.2)
BUN SERPL-MCNC: 71 MG/DL — HIGH (ref 7–23)
CALCIUM SERPL-MCNC: 8.6 MG/DL — SIGNIFICANT CHANGE UP (ref 8.4–10.5)
CHLORIDE SERPL-SCNC: 106 MMOL/L — SIGNIFICANT CHANGE UP (ref 96–108)
CO2 SERPL-SCNC: 23 MMOL/L — SIGNIFICANT CHANGE UP (ref 22–31)
CREAT SERPL-MCNC: 2.02 MG/DL — HIGH (ref 0.5–1.3)
GLUCOSE SERPL-MCNC: 97 MG/DL — SIGNIFICANT CHANGE UP (ref 70–99)
HCT VFR BLD CALC: 25.1 % — LOW (ref 39–50)
HGB BLD-MCNC: 8.6 G/DL — LOW (ref 13–17)
INR BLD: 1.49 RATIO — HIGH (ref 0.88–1.16)
MAGNESIUM SERPL-MCNC: 2.8 MG/DL — HIGH (ref 1.6–2.6)
MCHC RBC-ENTMCNC: 33.6 PG — SIGNIFICANT CHANGE UP (ref 27–34)
MCHC RBC-ENTMCNC: 34.4 GM/DL — SIGNIFICANT CHANGE UP (ref 32–36)
MCV RBC AUTO: 97.7 FL — SIGNIFICANT CHANGE UP (ref 80–100)
PHOSPHATE SERPL-MCNC: 3.5 MG/DL — SIGNIFICANT CHANGE UP (ref 2.5–4.5)
PLATELET # BLD AUTO: 170 K/UL — SIGNIFICANT CHANGE UP (ref 150–400)
POTASSIUM SERPL-MCNC: 3.9 MMOL/L — SIGNIFICANT CHANGE UP (ref 3.5–5.3)
POTASSIUM SERPL-SCNC: 3.9 MMOL/L — SIGNIFICANT CHANGE UP (ref 3.5–5.3)
PROT SERPL-MCNC: 5.5 G/DL — LOW (ref 6–8.3)
PROTHROM AB SERPL-ACNC: 16.4 SEC — HIGH (ref 9.8–12.7)
RBC # BLD: 2.57 M/UL — LOW (ref 4.2–5.8)
RBC # FLD: 16.4 % — HIGH (ref 10.3–14.5)
SODIUM SERPL-SCNC: 143 MMOL/L — SIGNIFICANT CHANGE UP (ref 135–145)
WBC # BLD: 6 K/UL — SIGNIFICANT CHANGE UP (ref 3.8–10.5)
WBC # FLD AUTO: 6 K/UL — SIGNIFICANT CHANGE UP (ref 3.8–10.5)

## 2017-03-31 PROCEDURE — 99233 SBSQ HOSP IP/OBS HIGH 50: CPT

## 2017-03-31 PROCEDURE — 93010 ELECTROCARDIOGRAM REPORT: CPT

## 2017-03-31 PROCEDURE — 99232 SBSQ HOSP IP/OBS MODERATE 35: CPT | Mod: GC

## 2017-03-31 RX ORDER — WARFARIN SODIUM 2.5 MG/1
3 TABLET ORAL ONCE
Qty: 0 | Refills: 0 | Status: COMPLETED | OUTPATIENT
Start: 2017-03-31 | End: 2017-03-31

## 2017-03-31 RX ORDER — SENNA PLUS 8.6 MG/1
2 TABLET ORAL AT BEDTIME
Qty: 0 | Refills: 0 | Status: DISCONTINUED | OUTPATIENT
Start: 2017-03-31 | End: 2017-04-03

## 2017-03-31 RX ORDER — POTASSIUM CHLORIDE 20 MEQ
20 PACKET (EA) ORAL ONCE
Qty: 0 | Refills: 0 | Status: COMPLETED | OUTPATIENT
Start: 2017-03-31 | End: 2017-03-31

## 2017-03-31 RX ORDER — FUROSEMIDE 40 MG
40 TABLET ORAL DAILY
Qty: 0 | Refills: 0 | Status: DISCONTINUED | OUTPATIENT
Start: 2017-04-01 | End: 2017-04-02

## 2017-03-31 RX ORDER — DOCUSATE SODIUM 100 MG
100 CAPSULE ORAL THREE TIMES A DAY
Qty: 0 | Refills: 0 | Status: DISCONTINUED | OUTPATIENT
Start: 2017-03-31 | End: 2017-04-03

## 2017-03-31 RX ADMIN — Medication 20 MILLIEQUIVALENT(S): at 05:47

## 2017-03-31 RX ADMIN — Medication 150 MICROGRAM(S): at 05:47

## 2017-03-31 RX ADMIN — PANTOPRAZOLE SODIUM 40 MILLIGRAM(S): 20 TABLET, DELAYED RELEASE ORAL at 17:21

## 2017-03-31 RX ADMIN — PANTOPRAZOLE SODIUM 40 MILLIGRAM(S): 20 TABLET, DELAYED RELEASE ORAL at 05:47

## 2017-03-31 RX ADMIN — SENNA PLUS 2 TABLET(S): 8.6 TABLET ORAL at 21:52

## 2017-03-31 RX ADMIN — SIMVASTATIN 20 MILLIGRAM(S): 20 TABLET, FILM COATED ORAL at 21:53

## 2017-03-31 RX ADMIN — Medication 100 MILLIGRAM(S): at 17:21

## 2017-03-31 RX ADMIN — WARFARIN SODIUM 3 MILLIGRAM(S): 2.5 TABLET ORAL at 21:52

## 2017-03-31 NOTE — DISCHARGE NOTE ADULT - MEDICATION SUMMARY - MEDICATIONS TO TAKE
I will START or STAY ON the medications listed below when I get home from the hospital:    aspirin 325 mg oral tablet  -- 1 tab(s) by mouth once a day  -- Indication: For Coronary artery disease involving native coronary artery of native heart without angina pectoris    Diovan 160 mg oral capsule  -- 1  by mouth once a day  -- Indication: For Acute decompensated heart failure    Coumadin 2 mg oral tablet  -- 1 tab(s) by mouth once a day  -- Do not take this drug if you are pregnant.  It is very important that you take or use this exactly as directed.  Do not skip doses or discontinue unless directed by your doctor.  Obtain medical advice before taking any non-prescription drugs as some may affect the action of this medication.    -- Indication: For Atrial Fibrillation    Zocor 20 mg oral tablet  -- 1 tab(s) by mouth once a day (at bedtime)  -- Indication: For Hyperlipidemia, unspecified hyperlipidemia type    metoprolol succinate 25 mg oral tablet, extended release  -- 1 tab(s) by mouth once a day  -- Indication: For Coronary artery disease involving native coronary artery of native heart without angina pectoris    pantoprazole 40 mg oral delayed release tablet  -- 1 tab(s) by mouth 2 times a day (before meals)  -- Indication: For treat GERD, reduce stomach acid    Synthroid 150 mcg (0.15 mg) oral tablet  -- 1 tab(s) by mouth once a day  -- Indication: For Hypothyroidism, unspecified type I will START or STAY ON the medications listed below when I get home from the hospital:    Aspirin Enteric Coated 81 mg oral delayed release tablet  -- 1 tab(s) by mouth once a day  -- Swallow whole.  Do not crush.  Take with food or milk.    -- Indication: For Coronary artery disease involving native coronary artery of native heart without angina pectoris    Diovan 160 mg oral capsule  -- 1  by mouth once a day  -- Indication: For Acute decompensated heart failure    Coumadin 2 mg oral tablet  -- 1 tab(s) by mouth once a day  -- Do not take this drug if you are pregnant.  It is very important that you take or use this exactly as directed.  Do not skip doses or discontinue unless directed by your doctor.  Obtain medical advice before taking any non-prescription drugs as some may affect the action of this medication.    -- Indication: For Atrial Fibrillation    Zocor 20 mg oral tablet  -- 1 tab(s) by mouth once a day (at bedtime)  -- Indication: For Hyperlipidemia, unspecified hyperlipidemia type    metoprolol succinate 25 mg oral tablet, extended release  -- 1 tab(s) by mouth once a day  -- Indication: For Coronary artery disease involving native coronary artery of native heart without angina pectoris    pantoprazole 40 mg oral delayed release tablet  -- 1 tab(s) by mouth 2 times a day (before meals)  -- Indication: For treat GERD, reduce stomach acid    Synthroid 150 mcg (0.15 mg) oral tablet  -- 1 tab(s) by mouth once a day  -- Indication: For Hypothyroidism, unspecified type

## 2017-03-31 NOTE — DISCHARGE NOTE ADULT - NS AS DC VTE INSTRUCTION
Coumadin/Warfarin - Potential for adverse drug reactions and interactions/Coumadin/Warfarin - Compliance.../Coumadin/Warfarin - Dietary Advice.../Coumadin/Warfarin - Follow-up monitoring... Coumadin/Warfarin - Compliance.../Coumadin/Warfarin - Dietary Advice.../Coumadin/Warfarin - Follow-up monitoring.../Coumadin/Warfarin - Potential for adverse drug reactions and interactions/drug information

## 2017-03-31 NOTE — DISCHARGE NOTE ADULT - CARE PROVIDER_API CALL
Benjamin Serrano (MD), Cardiovascular Disease; Internal Medicine  [address] [place] [address]  Phone: [phone]  Fax: [phone]    Janes Sanches), Internal Medicine  [address] [place] [address]  Phone: [phone]  Fax: [phone]

## 2017-03-31 NOTE — DISCHARGE NOTE ADULT - CARE PLAN
Principal Discharge DX:	Acute decompensated heart failure  Goal:	You will not be short of breath.  Instructions for follow-up, activity and diet:	Take your medications as prescribed. Follow a low-salt, low salt, low cholesterol heart healthy diet. Weigh yourself every day and keep a record; call your doctor if you gain 2 pounds over one to two days or 5 pounds over three days. Get to or maintain a healthy weight; ask your heart failure team for referrals to a registered dietitian if needed. Avoid alcohol. Be active (check with your physician or cardiologist first). Find healthy ways to deal with stress, such as deep breathing, meditation, exercise, and doing hobbies that you enjoy. If you smoke, quit. (A resource to help you stop smoking is the [facility] for Tobacco Control – phone number [phone].).  Secondary Diagnosis:	Chronic atrial fibrillation  Goal:	Your heart rate and rhythm will be controlled.  Instructions for follow-up, activity and diet:	Continue with your cardiologist and primary care MD. Continue your current medications. Call your physician for palpitations, feelings of rapid heart beat, lightheadedness, or dizziness. If you are on warfarin (Coumadin), have your blood work drawn (prescription given) on ________________. Ask your nurse for written material about Coumadin and to provide patient education before discharge. Principal Discharge DX:	Acute decompensated heart failure  Goal:	You will not be short of breath.  Instructions for follow-up, activity and diet:	Take your medications as prescribed. Follow a low-salt, low salt, low cholesterol heart healthy diet. Weigh yourself every day and keep a record; call your doctor if you gain 2 pounds over one to two days or 5 pounds over three days. Get to or maintain a healthy weight; ask your heart failure team for referrals to a registered dietitian if needed. Avoid alcohol. Be active (check with your physician or cardiologist first). Find healthy ways to deal with stress, such as deep breathing, meditation, exercise, and doing hobbies that you enjoy. If you smoke, quit. (A resource to help you stop smoking is the [facility] for Tobacco Control – phone number [phone].).  Secondary Diagnosis:	Chronic atrial fibrillation  Goal:	Your heart rate and rhythm will be controlled.  Instructions for follow-up, activity and diet:	Continue with your cardiologist and primary care MD. Continue your current medications. Call your physician for palpitations, feelings of rapid heart beat, lightheadedness, or dizziness. If you are on warfarin (Coumadin), have your blood work drawn (prescription given) on ________________. Ask your nurse for written material about Coumadin and to provide patient education before discharge. Principal Discharge DX:	Acute decompensated heart failure  Goal:	You will not be short of breath.  Instructions for follow-up, activity and diet:	Take your medications as prescribed. Follow a low-salt, low salt, low cholesterol heart healthy diet. Weigh yourself every day and keep a record; call your doctor if you gain 2 pounds over one to two days or 5 pounds over three days. Get to or maintain a healthy weight; ask your heart failure team for referrals to a registered dietitian if needed. Avoid alcohol. Be active (check with your physician or cardiologist first). Find healthy ways to deal with stress, such as deep breathing, meditation, exercise, and doing hobbies that you enjoy. If you smoke, quit. (A resource to help you stop smoking is the [facility] for Tobacco Control – phone number [phone].).  Secondary Diagnosis:	Chronic atrial fibrillation  Goal:	Your heart rate and rhythm will be controlled.  Instructions for follow-up, activity and diet:	Continue with your cardiologist and primary care MD. Continue your current medications. Call your physician for palpitations, feelings of rapid heart beat, lightheadedness, or dizziness. If you are on warfarin (Coumadin), have your blood work drawn (prescription given) on ________________. Ask your nurse for written material about Coumadin and to provide patient education before discharge. Principal Discharge DX:	Acute decompensated heart failure  Goal:	You will not be short of breath.  Instructions for follow-up, activity and diet:	Take your medications as prescribed. Follow a low-salt, low salt, low cholesterol heart healthy diet. Weigh yourself every day and keep a record; call your doctor if you gain 2 pounds over one to two days or 5 pounds over three days. Get to or maintain a healthy weight; ask your heart failure team for referrals to a registered dietitian if needed. Avoid alcohol. Be active (check with your physician or cardiologist first). Find healthy ways to deal with stress, such as deep breathing, meditation, exercise, and doing hobbies that you enjoy. If you smoke, quit. (A resource to help you stop smoking is the [facility] for Tobacco Control – phone number [phone].).  Secondary Diagnosis:	Chronic atrial fibrillation  Goal:	Your heart rate and rhythm will be controlled.  Instructions for follow-up, activity and diet:	Continue with your cardiologist and primary care MD. Continue your current medications. Call your physician for palpitations, feelings of rapid heart beat, lightheadedness, or dizziness. If you are on warfarin (Coumadin), have your blood work drawn (prescription given) on ________________. Ask your nurse for written material about Coumadin and to provide patient education before discharge. Principal Discharge DX:	Acute decompensated heart failure  Goal:	You will not be short of breath.  Instructions for follow-up, activity and diet:	Take your medications as prescribed. Follow a low-salt, low salt, low cholesterol heart healthy diet. Weigh yourself every day and keep a record; call your doctor if you gain 2 pounds over one to two days or 5 pounds over three days. Get to or maintain a healthy weight; ask your heart failure team for referrals to a registered dietitian if needed. Avoid alcohol. Be active (check with your physician or cardiologist first). Find healthy ways to deal with stress, such as deep breathing, meditation, exercise, and doing hobbies that you enjoy. If you smoke, quit. (A resource to help you stop smoking is the [facility] for Tobacco Control – phone number [phone].).  Secondary Diagnosis:	Chronic atrial fibrillation  Goal:	Your heart rate and rhythm will be controlled.  Instructions for follow-up, activity and diet:	Continue with your cardiologist and primary care MD. Continue your current medications. Call your physician for palpitations, feelings of rapid heart beat, lightheadedness, or dizziness. If you are on warfarin (Coumadin), have your blood work drawn (prescription given) on ________________. Ask your nurse for written material about Coumadin and to provide patient education before discharge. Principal Discharge DX:	Acute decompensated heart failure  Goal:	You will not be short of breath.  Instructions for follow-up, activity and diet:	Take your medications as prescribed. Follow a low-salt, low salt, low cholesterol heart healthy diet. Weigh yourself every day and keep a record; call your doctor if you gain 2 pounds over one to two days or 5 pounds over three days. Get to or maintain a healthy weight; ask your heart failure team for referrals to a registered dietitian if needed. Avoid alcohol. Be active (check with your physician or cardiologist first). Find healthy ways to deal with stress, such as deep breathing, meditation, exercise, and doing hobbies that you enjoy. If you smoke, quit. (A resource to help you stop smoking is the [facility] for Tobacco Control – phone number [phone].).  Secondary Diagnosis:	Chronic atrial fibrillation  Goal:	Your heart rate and rhythm will be controlled.  Instructions for follow-up, activity and diet:	Continue with your cardiologist and primary care MD. Continue your current medications. Call your physician for palpitations, feelings of rapid heart beat, lightheadedness, or dizziness. If you are on warfarin (Coumadin), have your blood work drawn (prescription given) on ________________. Ask your nurse for written material about Coumadin and to provide patient education before discharge. Principal Discharge DX:	Acute decompensated heart failure  Goal:	You will not be short of breath.  Instructions for follow-up, activity and diet:	Take your medications as prescribed. Follow a low-salt, low salt, low cholesterol heart healthy diet. Weigh yourself every day and keep a record; call your doctor if you gain 2 pounds over one to two days or 5 pounds over three days. Get to or maintain a healthy weight; ask your heart failure team for referrals to a registered dietitian if needed. Avoid alcohol. Be active (check with your physician or cardiologist first). Find healthy ways to deal with stress, such as deep breathing, meditation, exercise, and doing hobbies that you enjoy. If you smoke, quit. (A resource to help you stop smoking is the [facility] for Tobacco Control – phone number [phone].).  Secondary Diagnosis:	Chronic atrial fibrillation  Goal:	Your heart rate and rhythm will be controlled.  Instructions for follow-up, activity and diet:	Continue with your cardiologist and primary care MD. Continue your current medications. Call your physician for palpitations, feelings of rapid heart beat, lightheadedness, or dizziness. If you are on warfarin (Coumadin), have your blood work drawn (prescription given) on ________________. Ask your nurse for written material about Coumadin and to provide patient education before discharge. Principal Discharge DX:	Acute decompensated heart failure  Goal:	You will not be short of breath.  Instructions for follow-up, activity and diet:	Take your medications as prescribed. Follow a low-salt, low salt, low cholesterol heart healthy diet. Weigh yourself every day and keep a record; call your doctor if you gain 2 pounds over one to two days or 5 pounds over three days. Get to or maintain a healthy weight; ask your heart failure team for referrals to a registered dietitian if needed. Avoid alcohol. Be active (check with your physician or cardiologist first). Find healthy ways to deal with stress, such as deep breathing, meditation, exercise, and doing hobbies that you enjoy. If you smoke, quit. (A resource to help you stop smoking is the [facility] for Tobacco Control – phone number [phone].).  Secondary Diagnosis:	Chronic atrial fibrillation  Goal:	Your heart rate and rhythm will be controlled.  Instructions for follow-up, activity and diet:	Continue with your cardiologist and primary care MD. Continue your current medications. Call your physician for palpitations, feelings of rapid heart beat, lightheadedness, or dizziness. If you are on warfarin (Coumadin), have your blood work drawn (prescription given) on ________________. Ask your nurse for written material about Coumadin and to provide patient education before discharge. Principal Discharge DX:	Acute decompensated heart failure  Goal:	You will not be short of breath.  Instructions for follow-up, activity and diet:	Take your medications as prescribed. Follow a low-salt, low salt, low cholesterol heart healthy diet. Weigh yourself every day and keep a record; call your doctor if you gain 2 pounds over one to two days or 5 pounds over three days. Get to or maintain a healthy weight; ask your heart failure team for referrals to a registered dietitian if needed. Avoid alcohol. Be active (check with your physician or cardiologist first). Find healthy ways to deal with stress, such as deep breathing, meditation, exercise, and doing hobbies that you enjoy. If you smoke, quit. (A resource to help you stop smoking is the [facility] for Tobacco Control – phone number [phone].).  Secondary Diagnosis:	Chronic atrial fibrillation  Goal:	Your heart rate and rhythm will be controlled.  Instructions for follow-up, activity and diet:	Continue with your cardiologist and primary care MD. Continue your current medications. Call your physician for palpitations, feelings of rapid heart beat, lightheadedness, or dizziness. If you are on warfarin (Coumadin), have your blood work drawn (prescription given) on ________________. Ask your nurse for written material about Coumadin and to provide patient education before discharge. Principal Discharge DX:	Acute decompensated heart failure  Goal:	You will not be short of breath.  Instructions for follow-up, activity and diet:	Take your medications as prescribed. Follow a low-salt, low salt, low cholesterol heart healthy diet. Weigh yourself every day and keep a record; call your doctor if you gain 2 pounds over one to two days or 5 pounds over three days. Get to or maintain a healthy weight; ask your heart failure team for referrals to a registered dietitian if needed. Avoid alcohol. Be active (check with your physician or cardiologist first). Find healthy ways to deal with stress, such as deep breathing, meditation, exercise, and doing hobbies that you enjoy. If you smoke, quit. (A resource to help you stop smoking is the [facility] for Tobacco Control – phone number [phone].).  Secondary Diagnosis:	Chronic atrial fibrillation  Goal:	Your heart rate and rhythm will be controlled.  Instructions for follow-up, activity and diet:	Continue with your cardiologist and primary care MD. Continue your current medications. Call your physician for palpitations, feelings of rapid heart beat, lightheadedness, or dizziness. If you are on warfarin (Coumadin), have your blood work drawn (prescription given) on ________________. Ask your nurse for written material about Coumadin and to provide patient education before discharge.

## 2017-03-31 NOTE — DISCHARGE NOTE ADULT - CARE PROVIDERS DIRECT ADDRESSES
[email or web address]

## 2017-03-31 NOTE — DISCHARGE NOTE ADULT - MEDICATION SUMMARY - MEDICATIONS TO CHANGE
I will SWITCH the dose or number of times a day I take the medications listed below when I get home from the hospital:  None I will SWITCH the dose or number of times a day I take the medications listed below when I get home from the hospital:    aspirin 325 mg oral tablet  -- 1 tab(s) by mouth once a day

## 2017-03-31 NOTE — DISCHARGE NOTE ADULT - MEDICATION SUMMARY - MEDICATIONS TO STOP TAKING
I will STOP taking the medications listed below when I get home from the hospital:    Coreg 3.125 mg oral tablet  -- 1 tab(s) by mouth 2 times a day    Xarelto 20 mg oral tablet  -- 1 tab(s) by mouth once a day (in the evening)

## 2017-03-31 NOTE — DISCHARGE NOTE ADULT - PLAN OF CARE
You will not be short of breath. Take your medications as prescribed. Follow a low-salt, low salt, low cholesterol heart healthy diet. Weigh yourself every day and keep a record; call your doctor if you gain 2 pounds over one to two days or 5 pounds over three days. Get to or maintain a healthy weight; ask your heart failure team for referrals to a registered dietitian if needed. Avoid alcohol. Be active (check with your physician or cardiologist first). Find healthy ways to deal with stress, such as deep breathing, meditation, exercise, and doing hobbies that you enjoy. If you smoke, quit. (A resource to help you stop smoking is the [facility] for Tobacco Control – phone number [phone].). Your heart rate and rhythm will be controlled. Continue with your cardiologist and primary care MD. Continue your current medications. Call your physician for palpitations, feelings of rapid heart beat, lightheadedness, or dizziness. If you are on warfarin (Coumadin), have your blood work drawn (prescription given) on ________________. Ask your nurse for written material about Coumadin and to provide patient education before discharge.

## 2017-03-31 NOTE — DISCHARGE NOTE ADULT - NSFTFHOME1RD_GEN_ALL_CORE
Pain greater than 7 on scale of 10 on ambulation/Shortness of breath with minimal ambulation/Chest pain/weakness during/after ambulation greater than 20 feet/Fall risk

## 2017-03-31 NOTE — DISCHARGE NOTE ADULT - HOSPITAL COURSE
89 yo M with PMH of sCHF, HTN/HLD, CAD ?s/p stent, afib on xarelto presenting with significant body swelling, generalized weakness, and worsening dyspnea on exertion for the past several months. Pt notes swelling in his legs up to the level of his hips, scrotal edema, orthopnea, and feeling sob on minimal exertion. The patient was diuresed wth lasix gtt, then placed on PO lasix. Patient received 1prbc due to anemia. 89 yo M with PMH of sCHF, HTN/HLD, CAD ?s/p stent, afib on xarelto presenting with significant body swelling, generalized weakness, and worsening dyspnea on exertion for the past several months. Pt notes swelling in his legs up to the level of his hips, scrotal edema, orthopnea, and feeling sob on minimal exertion. The patient was diuresed wth lasix gtt, then placed on PO lasix. Patient received 1prbc due to anemia. Patient restarted on coumadin for a goal INR of 1.6-1.8 due to history of melena and hematuria.

## 2017-03-31 NOTE — DISCHARGE NOTE ADULT - PATIENT PORTAL LINK FT
“You can access the FollowHealth Patient Portal, offered by [facility], by registering with the following website: [email or web address]”

## 2017-03-31 NOTE — DISCHARGE NOTE ADULT - HOME CARE AGENCY
[facility] ([phone]), SOC Monday, 4/3/17 [facility] ([phone]), SOC Monday, 4/4/17

## 2017-03-31 NOTE — DISCHARGE NOTE ADULT - OTHER SIGNIFICANT FINDINGS
3/30/17 TTE: EF 55%  1. Calcified trileaflet aortic valve with decreased  opening. Peak transaortic valve gradient equals 13 mm Hg,  mean transaortic valve gradient equals 7 mm Hg, estimated  aortic valve area equals 1.5 sqcm (by continuity equation),  aortic valve velocity time integral equals 36 cm,  consistent with moderate aortic stenosis. Mild aortic  regurgitation.  2. Severely dilated left atrium.  LA volume index = 69  cc/m2.  3. Increased relative wall thickness with normal left  ventricular mass index, consistent with concentric left  ventricular remodeling.  4. Normal left ventricular systolic function. No segmental  wall motion abnormalities.  5. Normal right ventricular size and function.  6. Estimated pulmonary artery systolic pressure equals 46  mm Hg, assuming right atrial pressure equals 8 mm Hg,  consistent with mild pulmonary pressures.  7. Bilateral pleural effusions.

## 2017-04-01 LAB
ALBUMIN SERPL ELPH-MCNC: 3 G/DL — LOW (ref 3.3–5)
ALP SERPL-CCNC: 56 U/L — SIGNIFICANT CHANGE UP (ref 40–120)
ALT FLD-CCNC: 12 U/L RC — SIGNIFICANT CHANGE UP (ref 10–45)
ANION GAP SERPL CALC-SCNC: 13 MMOL/L — SIGNIFICANT CHANGE UP (ref 5–17)
APTT BLD: 34 SEC — SIGNIFICANT CHANGE UP (ref 27.5–37.4)
AST SERPL-CCNC: 21 U/L — SIGNIFICANT CHANGE UP (ref 10–40)
BILIRUB SERPL-MCNC: 0.4 MG/DL — SIGNIFICANT CHANGE UP (ref 0.2–1.2)
BUN SERPL-MCNC: 58 MG/DL — HIGH (ref 7–23)
CALCIUM SERPL-MCNC: 8.5 MG/DL — SIGNIFICANT CHANGE UP (ref 8.4–10.5)
CHLORIDE SERPL-SCNC: 106 MMOL/L — SIGNIFICANT CHANGE UP (ref 96–108)
CO2 SERPL-SCNC: 25 MMOL/L — SIGNIFICANT CHANGE UP (ref 22–31)
CREAT SERPL-MCNC: 1.84 MG/DL — HIGH (ref 0.5–1.3)
GLUCOSE SERPL-MCNC: 99 MG/DL — SIGNIFICANT CHANGE UP (ref 70–99)
HCT VFR BLD CALC: 25.6 % — LOW (ref 39–50)
HGB BLD-MCNC: 8.8 G/DL — LOW (ref 13–17)
INR BLD: 1.46 RATIO — HIGH (ref 0.88–1.16)
MAGNESIUM SERPL-MCNC: 2.2 MG/DL — SIGNIFICANT CHANGE UP (ref 1.6–2.6)
MCHC RBC-ENTMCNC: 33.7 PG — SIGNIFICANT CHANGE UP (ref 27–34)
MCHC RBC-ENTMCNC: 34.1 GM/DL — SIGNIFICANT CHANGE UP (ref 32–36)
MCV RBC AUTO: 98.7 FL — SIGNIFICANT CHANGE UP (ref 80–100)
PHOSPHATE SERPL-MCNC: 3.3 MG/DL — SIGNIFICANT CHANGE UP (ref 2.5–4.5)
PLATELET # BLD AUTO: 185 K/UL — SIGNIFICANT CHANGE UP (ref 150–400)
POTASSIUM SERPL-MCNC: 4.2 MMOL/L — SIGNIFICANT CHANGE UP (ref 3.5–5.3)
POTASSIUM SERPL-SCNC: 4.2 MMOL/L — SIGNIFICANT CHANGE UP (ref 3.5–5.3)
PROT SERPL-MCNC: 5.7 G/DL — LOW (ref 6–8.3)
PROTHROM AB SERPL-ACNC: 15.9 SEC — HIGH (ref 9.8–12.7)
RBC # BLD: 2.6 M/UL — LOW (ref 4.2–5.8)
RBC # FLD: 15.9 % — HIGH (ref 10.3–14.5)
SODIUM SERPL-SCNC: 144 MMOL/L — SIGNIFICANT CHANGE UP (ref 135–145)
WBC # BLD: 5.1 K/UL — SIGNIFICANT CHANGE UP (ref 3.8–10.5)
WBC # FLD AUTO: 5.1 K/UL — SIGNIFICANT CHANGE UP (ref 3.8–10.5)

## 2017-04-01 PROCEDURE — 93010 ELECTROCARDIOGRAM REPORT: CPT

## 2017-04-01 PROCEDURE — 99233 SBSQ HOSP IP/OBS HIGH 50: CPT | Mod: GC

## 2017-04-01 PROCEDURE — 99232 SBSQ HOSP IP/OBS MODERATE 35: CPT | Mod: GC

## 2017-04-01 RX ORDER — WARFARIN SODIUM 2.5 MG/1
2 TABLET ORAL ONCE
Qty: 0 | Refills: 0 | Status: COMPLETED | OUTPATIENT
Start: 2017-04-01 | End: 2017-04-01

## 2017-04-01 RX ADMIN — Medication 100 MILLIGRAM(S): at 14:46

## 2017-04-01 RX ADMIN — Medication 150 MICROGRAM(S): at 06:06

## 2017-04-01 RX ADMIN — Medication 100 MILLIGRAM(S): at 23:03

## 2017-04-01 RX ADMIN — PANTOPRAZOLE SODIUM 40 MILLIGRAM(S): 20 TABLET, DELAYED RELEASE ORAL at 06:06

## 2017-04-01 RX ADMIN — Medication 100 MILLIGRAM(S): at 10:30

## 2017-04-01 RX ADMIN — SENNA PLUS 2 TABLET(S): 8.6 TABLET ORAL at 23:03

## 2017-04-01 RX ADMIN — SIMVASTATIN 20 MILLIGRAM(S): 20 TABLET, FILM COATED ORAL at 23:03

## 2017-04-01 RX ADMIN — Medication 40 MILLIGRAM(S): at 06:06

## 2017-04-01 RX ADMIN — WARFARIN SODIUM 2 MILLIGRAM(S): 2.5 TABLET ORAL at 23:03

## 2017-04-02 LAB
ALBUMIN SERPL ELPH-MCNC: 2.8 G/DL — LOW (ref 3.3–5)
ALP SERPL-CCNC: 57 U/L — SIGNIFICANT CHANGE UP (ref 40–120)
ALT FLD-CCNC: 10 U/L RC — SIGNIFICANT CHANGE UP (ref 10–45)
ANION GAP SERPL CALC-SCNC: 13 MMOL/L — SIGNIFICANT CHANGE UP (ref 5–17)
APTT BLD: 32.9 SEC — SIGNIFICANT CHANGE UP (ref 27.5–37.4)
AST SERPL-CCNC: 22 U/L — SIGNIFICANT CHANGE UP (ref 10–40)
BILIRUB SERPL-MCNC: 0.3 MG/DL — SIGNIFICANT CHANGE UP (ref 0.2–1.2)
BUN SERPL-MCNC: 50 MG/DL — HIGH (ref 7–23)
CALCIUM SERPL-MCNC: 8.5 MG/DL — SIGNIFICANT CHANGE UP (ref 8.4–10.5)
CHLORIDE SERPL-SCNC: 108 MMOL/L — SIGNIFICANT CHANGE UP (ref 96–108)
CO2 SERPL-SCNC: 26 MMOL/L — SIGNIFICANT CHANGE UP (ref 22–31)
CREAT SERPL-MCNC: 1.67 MG/DL — HIGH (ref 0.5–1.3)
GLUCOSE SERPL-MCNC: 131 MG/DL — HIGH (ref 70–99)
HCT VFR BLD CALC: 25.9 % — LOW (ref 39–50)
HGB BLD-MCNC: 8.6 G/DL — LOW (ref 13–17)
INR BLD: 1.41 RATIO — HIGH (ref 0.88–1.16)
MAGNESIUM SERPL-MCNC: 2.2 MG/DL — SIGNIFICANT CHANGE UP (ref 1.6–2.6)
MCHC RBC-ENTMCNC: 32.7 PG — SIGNIFICANT CHANGE UP (ref 27–34)
MCHC RBC-ENTMCNC: 33.3 GM/DL — SIGNIFICANT CHANGE UP (ref 32–36)
MCV RBC AUTO: 98.4 FL — SIGNIFICANT CHANGE UP (ref 80–100)
NT-PROBNP SERPL-SCNC: 5895 PG/ML — HIGH (ref 0–300)
OB PNL STL: NEGATIVE — SIGNIFICANT CHANGE UP
PHOSPHATE SERPL-MCNC: 3.6 MG/DL — SIGNIFICANT CHANGE UP (ref 2.5–4.5)
PLATELET # BLD AUTO: 204 K/UL — SIGNIFICANT CHANGE UP (ref 150–400)
POTASSIUM SERPL-MCNC: 4 MMOL/L — SIGNIFICANT CHANGE UP (ref 3.5–5.3)
POTASSIUM SERPL-SCNC: 4 MMOL/L — SIGNIFICANT CHANGE UP (ref 3.5–5.3)
PROT SERPL-MCNC: 5.3 G/DL — LOW (ref 6–8.3)
PROTHROM AB SERPL-ACNC: 15.3 SEC — HIGH (ref 9.8–12.7)
RBC # BLD: 2.63 M/UL — LOW (ref 4.2–5.8)
RBC # FLD: 15 % — HIGH (ref 10.3–14.5)
SODIUM SERPL-SCNC: 147 MMOL/L — HIGH (ref 135–145)
T3 SERPL-MCNC: 52 NG/DL — LOW (ref 80–200)
WBC # BLD: 5.2 K/UL — SIGNIFICANT CHANGE UP (ref 3.8–10.5)
WBC # FLD AUTO: 5.2 K/UL — SIGNIFICANT CHANGE UP (ref 3.8–10.5)

## 2017-04-02 PROCEDURE — 93010 ELECTROCARDIOGRAM REPORT: CPT

## 2017-04-02 PROCEDURE — 99233 SBSQ HOSP IP/OBS HIGH 50: CPT | Mod: GC

## 2017-04-02 PROCEDURE — 71010: CPT | Mod: 26

## 2017-04-02 RX ORDER — POLYETHYLENE GLYCOL 3350 17 G/17G
17 POWDER, FOR SOLUTION ORAL DAILY
Qty: 0 | Refills: 0 | Status: DISCONTINUED | OUTPATIENT
Start: 2017-04-02 | End: 2017-04-03

## 2017-04-02 RX ORDER — METOPROLOL TARTRATE 50 MG
12.5 TABLET ORAL ONCE
Qty: 0 | Refills: 0 | Status: COMPLETED | OUTPATIENT
Start: 2017-04-02 | End: 2017-04-02

## 2017-04-02 RX ORDER — METOPROLOL TARTRATE 50 MG
12.5 TABLET ORAL
Qty: 0 | Refills: 0 | Status: DISCONTINUED | OUTPATIENT
Start: 2017-04-02 | End: 2017-04-03

## 2017-04-02 RX ORDER — FUROSEMIDE 40 MG
20 TABLET ORAL DAILY
Qty: 0 | Refills: 0 | Status: DISCONTINUED | OUTPATIENT
Start: 2017-04-03 | End: 2017-04-03

## 2017-04-02 RX ADMIN — Medication 150 MICROGRAM(S): at 06:29

## 2017-04-02 RX ADMIN — Medication 100 MILLIGRAM(S): at 17:42

## 2017-04-02 RX ADMIN — PANTOPRAZOLE SODIUM 40 MILLIGRAM(S): 20 TABLET, DELAYED RELEASE ORAL at 17:43

## 2017-04-02 RX ADMIN — POLYETHYLENE GLYCOL 3350 17 GRAM(S): 17 POWDER, FOR SOLUTION ORAL at 11:50

## 2017-04-02 RX ADMIN — Medication 12.5 MILLIGRAM(S): at 10:30

## 2017-04-02 RX ADMIN — Medication 100 MILLIGRAM(S): at 10:49

## 2017-04-02 RX ADMIN — PANTOPRAZOLE SODIUM 40 MILLIGRAM(S): 20 TABLET, DELAYED RELEASE ORAL at 06:29

## 2017-04-02 RX ADMIN — Medication 40 MILLIGRAM(S): at 06:29

## 2017-04-02 RX ADMIN — Medication 12.5 MILLIGRAM(S): at 21:52

## 2017-04-02 RX ADMIN — Medication 100 MILLIGRAM(S): at 21:52

## 2017-04-02 RX ADMIN — SIMVASTATIN 20 MILLIGRAM(S): 20 TABLET, FILM COATED ORAL at 21:52

## 2017-04-03 VITALS
RESPIRATION RATE: 18 BRPM | OXYGEN SATURATION: 99 % | HEART RATE: 90 BPM | DIASTOLIC BLOOD PRESSURE: 55 MMHG | SYSTOLIC BLOOD PRESSURE: 80 MMHG | TEMPERATURE: 98 F

## 2017-04-03 LAB
ALBUMIN SERPL ELPH-MCNC: 3 G/DL — LOW (ref 3.3–5)
ALP SERPL-CCNC: 60 U/L — SIGNIFICANT CHANGE UP (ref 40–120)
ALT FLD-CCNC: 16 U/L RC — SIGNIFICANT CHANGE UP (ref 10–45)
ANION GAP SERPL CALC-SCNC: 12 MMOL/L — SIGNIFICANT CHANGE UP (ref 5–17)
APTT BLD: 33.1 SEC — SIGNIFICANT CHANGE UP (ref 27.5–37.4)
AST SERPL-CCNC: 23 U/L — SIGNIFICANT CHANGE UP (ref 10–40)
BILIRUB SERPL-MCNC: 0.4 MG/DL — SIGNIFICANT CHANGE UP (ref 0.2–1.2)
BUN SERPL-MCNC: 44 MG/DL — HIGH (ref 7–23)
CALCIUM SERPL-MCNC: 8.6 MG/DL — SIGNIFICANT CHANGE UP (ref 8.4–10.5)
CHLORIDE SERPL-SCNC: 104 MMOL/L — SIGNIFICANT CHANGE UP (ref 96–108)
CO2 SERPL-SCNC: 27 MMOL/L — SIGNIFICANT CHANGE UP (ref 22–31)
CREAT SERPL-MCNC: 1.68 MG/DL — HIGH (ref 0.5–1.3)
CULTURE RESULTS: SIGNIFICANT CHANGE UP
CULTURE RESULTS: SIGNIFICANT CHANGE UP
GLUCOSE SERPL-MCNC: 109 MG/DL — HIGH (ref 70–99)
HCT VFR BLD CALC: 25.7 % — LOW (ref 39–50)
HGB BLD-MCNC: 8.8 G/DL — LOW (ref 13–17)
INR BLD: 1.37 RATIO — HIGH (ref 0.88–1.16)
MAGNESIUM SERPL-MCNC: 2.1 MG/DL — SIGNIFICANT CHANGE UP (ref 1.6–2.6)
MCHC RBC-ENTMCNC: 33.3 PG — SIGNIFICANT CHANGE UP (ref 27–34)
MCHC RBC-ENTMCNC: 34 GM/DL — SIGNIFICANT CHANGE UP (ref 32–36)
MCV RBC AUTO: 97.9 FL — SIGNIFICANT CHANGE UP (ref 80–100)
PHOSPHATE SERPL-MCNC: 3.5 MG/DL — SIGNIFICANT CHANGE UP (ref 2.5–4.5)
PLATELET # BLD AUTO: 191 K/UL — SIGNIFICANT CHANGE UP (ref 150–400)
POTASSIUM SERPL-MCNC: 4.2 MMOL/L — SIGNIFICANT CHANGE UP (ref 3.5–5.3)
POTASSIUM SERPL-SCNC: 4.2 MMOL/L — SIGNIFICANT CHANGE UP (ref 3.5–5.3)
PROT SERPL-MCNC: 5.7 G/DL — LOW (ref 6–8.3)
PROTHROM AB SERPL-ACNC: 15 SEC — HIGH (ref 9.8–12.7)
RBC # BLD: 2.63 M/UL — LOW (ref 4.2–5.8)
RBC # FLD: 15 % — HIGH (ref 10.3–14.5)
SODIUM SERPL-SCNC: 143 MMOL/L — SIGNIFICANT CHANGE UP (ref 135–145)
SPECIMEN SOURCE: SIGNIFICANT CHANGE UP
SPECIMEN SOURCE: SIGNIFICANT CHANGE UP
WBC # BLD: 6 K/UL — SIGNIFICANT CHANGE UP (ref 3.8–10.5)
WBC # FLD AUTO: 6 K/UL — SIGNIFICANT CHANGE UP (ref 3.8–10.5)

## 2017-04-03 PROCEDURE — 96374 THER/PROPH/DIAG INJ IV PUSH: CPT

## 2017-04-03 PROCEDURE — 84480 ASSAY TRIIODOTHYRONINE (T3): CPT

## 2017-04-03 PROCEDURE — 83880 ASSAY OF NATRIURETIC PEPTIDE: CPT

## 2017-04-03 PROCEDURE — 82553 CREATINE MB FRACTION: CPT

## 2017-04-03 PROCEDURE — 84540 ASSAY OF URINE/UREA-N: CPT

## 2017-04-03 PROCEDURE — 84132 ASSAY OF SERUM POTASSIUM: CPT

## 2017-04-03 PROCEDURE — 82570 ASSAY OF URINE CREATININE: CPT

## 2017-04-03 PROCEDURE — 82550 ASSAY OF CK (CPK): CPT

## 2017-04-03 PROCEDURE — 82330 ASSAY OF CALCIUM: CPT

## 2017-04-03 PROCEDURE — 84100 ASSAY OF PHOSPHORUS: CPT

## 2017-04-03 PROCEDURE — 82272 OCCULT BLD FECES 1-3 TESTS: CPT

## 2017-04-03 PROCEDURE — 80061 LIPID PANEL: CPT

## 2017-04-03 PROCEDURE — 93005 ELECTROCARDIOGRAM TRACING: CPT

## 2017-04-03 PROCEDURE — 99285 EMERGENCY DEPT VISIT HI MDM: CPT | Mod: 25

## 2017-04-03 PROCEDURE — 82340 ASSAY OF CALCIUM IN URINE: CPT

## 2017-04-03 PROCEDURE — 83605 ASSAY OF LACTIC ACID: CPT

## 2017-04-03 PROCEDURE — 83735 ASSAY OF MAGNESIUM: CPT

## 2017-04-03 PROCEDURE — 82803 BLOOD GASES ANY COMBINATION: CPT

## 2017-04-03 PROCEDURE — 84133 ASSAY OF URINE POTASSIUM: CPT

## 2017-04-03 PROCEDURE — 85610 PROTHROMBIN TIME: CPT

## 2017-04-03 PROCEDURE — 82728 ASSAY OF FERRITIN: CPT

## 2017-04-03 PROCEDURE — 82435 ASSAY OF BLOOD CHLORIDE: CPT

## 2017-04-03 PROCEDURE — 82436 ASSAY OF URINE CHLORIDE: CPT

## 2017-04-03 PROCEDURE — 82607 VITAMIN B-12: CPT

## 2017-04-03 PROCEDURE — 36430 TRANSFUSION BLD/BLD COMPNT: CPT

## 2017-04-03 PROCEDURE — 83036 HEMOGLOBIN GLYCOSYLATED A1C: CPT

## 2017-04-03 PROCEDURE — 83540 ASSAY OF IRON: CPT

## 2017-04-03 PROCEDURE — 71045 X-RAY EXAM CHEST 1 VIEW: CPT

## 2017-04-03 PROCEDURE — 97161 PT EVAL LOW COMPLEX 20 MIN: CPT

## 2017-04-03 PROCEDURE — 85027 COMPLETE CBC AUTOMATED: CPT

## 2017-04-03 PROCEDURE — 83935 ASSAY OF URINE OSMOLALITY: CPT

## 2017-04-03 PROCEDURE — 82947 ASSAY GLUCOSE BLOOD QUANT: CPT

## 2017-04-03 PROCEDURE — 84443 ASSAY THYROID STIM HORMONE: CPT

## 2017-04-03 PROCEDURE — 84295 ASSAY OF SERUM SODIUM: CPT

## 2017-04-03 PROCEDURE — 86901 BLOOD TYPING SEROLOGIC RH(D): CPT

## 2017-04-03 PROCEDURE — 84484 ASSAY OF TROPONIN QUANT: CPT

## 2017-04-03 PROCEDURE — 71046 X-RAY EXAM CHEST 2 VIEWS: CPT

## 2017-04-03 PROCEDURE — 87040 BLOOD CULTURE FOR BACTERIA: CPT

## 2017-04-03 PROCEDURE — 93010 ELECTROCARDIOGRAM REPORT: CPT

## 2017-04-03 PROCEDURE — P9016: CPT

## 2017-04-03 PROCEDURE — 83550 IRON BINDING TEST: CPT

## 2017-04-03 PROCEDURE — 86850 RBC ANTIBODY SCREEN: CPT

## 2017-04-03 PROCEDURE — 85014 HEMATOCRIT: CPT

## 2017-04-03 PROCEDURE — 93306 TTE W/DOPPLER COMPLETE: CPT

## 2017-04-03 PROCEDURE — 80053 COMPREHEN METABOLIC PANEL: CPT

## 2017-04-03 PROCEDURE — 85730 THROMBOPLASTIN TIME PARTIAL: CPT

## 2017-04-03 PROCEDURE — 86900 BLOOD TYPING SEROLOGIC ABO: CPT

## 2017-04-03 PROCEDURE — 86923 COMPATIBILITY TEST ELECTRIC: CPT

## 2017-04-03 PROCEDURE — 84436 ASSAY OF TOTAL THYROXINE: CPT

## 2017-04-03 RX ORDER — ASPIRIN/CALCIUM CARB/MAGNESIUM 324 MG
1 TABLET ORAL
Qty: 0 | Refills: 0 | COMMUNITY

## 2017-04-03 RX ORDER — ASPIRIN/CALCIUM CARB/MAGNESIUM 324 MG
1 TABLET ORAL
Qty: 15 | Refills: 0 | OUTPATIENT
Start: 2017-04-03 | End: 2017-04-18

## 2017-04-03 RX ORDER — FUROSEMIDE 40 MG
1.5 TABLET ORAL
Qty: 0 | Refills: 0 | COMMUNITY

## 2017-04-03 RX ORDER — CARVEDILOL PHOSPHATE 80 MG/1
1 CAPSULE, EXTENDED RELEASE ORAL
Qty: 0 | Refills: 0 | COMMUNITY

## 2017-04-03 RX ORDER — PANTOPRAZOLE SODIUM 20 MG/1
1 TABLET, DELAYED RELEASE ORAL
Qty: 30 | Refills: 0 | OUTPATIENT
Start: 2017-04-03 | End: 2017-04-18

## 2017-04-03 RX ORDER — WARFARIN SODIUM 2.5 MG/1
2 TABLET ORAL ONCE
Qty: 0 | Refills: 0 | Status: COMPLETED | OUTPATIENT
Start: 2017-04-03 | End: 2017-04-03

## 2017-04-03 RX ORDER — RIVAROXABAN 15 MG-20MG
1 KIT ORAL
Qty: 0 | Refills: 0 | COMMUNITY

## 2017-04-03 RX ORDER — METOPROLOL TARTRATE 50 MG
1 TABLET ORAL
Qty: 15 | Refills: 0 | OUTPATIENT
Start: 2017-04-03 | End: 2017-04-18

## 2017-04-03 RX ORDER — WARFARIN SODIUM 2.5 MG/1
1 TABLET ORAL
Qty: 15 | Refills: 0 | OUTPATIENT
Start: 2017-04-03 | End: 2017-04-18

## 2017-04-03 RX ORDER — METOPROLOL TARTRATE 50 MG
25 TABLET ORAL DAILY
Qty: 0 | Refills: 0 | Status: DISCONTINUED | OUTPATIENT
Start: 2017-04-03 | End: 2017-04-03

## 2017-04-03 RX ADMIN — WARFARIN SODIUM 2 MILLIGRAM(S): 2.5 TABLET ORAL at 11:38

## 2017-04-03 RX ADMIN — Medication 12.5 MILLIGRAM(S): at 10:15

## 2017-04-03 RX ADMIN — PANTOPRAZOLE SODIUM 40 MILLIGRAM(S): 20 TABLET, DELAYED RELEASE ORAL at 05:27

## 2017-04-03 RX ADMIN — Medication 20 MILLIGRAM(S): at 05:27

## 2017-04-03 RX ADMIN — Medication 150 MICROGRAM(S): at 05:27

## 2017-04-03 RX ADMIN — Medication 100 MILLIGRAM(S): at 10:15

## 2017-04-04 LAB
CREAT ?TM UR-MCNC: 32 MG/DL — SIGNIFICANT CHANGE UP (ref 20–370)
IRON ?TM UR-MCNC: SIGNIFICANT CHANGE UP
T4 SERPL-MCNC: 4.7 UG/DL — SIGNIFICANT CHANGE UP

## 2017-04-16 ENCOUNTER — LABORATORY RESULT (OUTPATIENT)
Age: 82
End: 2017-04-16

## 2017-04-17 ENCOUNTER — NON-APPOINTMENT (OUTPATIENT)
Age: 82
End: 2017-04-17

## 2017-04-17 ENCOUNTER — APPOINTMENT (OUTPATIENT)
Dept: CARDIOLOGY | Facility: CLINIC | Age: 82
End: 2017-04-17

## 2017-04-17 VITALS
HEIGHT: 67 IN | SYSTOLIC BLOOD PRESSURE: 100 MMHG | DIASTOLIC BLOOD PRESSURE: 66 MMHG | BODY MASS INDEX: 23.07 KG/M2 | HEART RATE: 117 BPM | WEIGHT: 147 LBS | OXYGEN SATURATION: 89 %

## 2017-04-17 LAB — INR PPP: 2.3 RATIO

## 2017-04-17 RX ORDER — WARFARIN 2 MG/1
2 TABLET ORAL
Qty: 20 | Refills: 6 | Status: ACTIVE | COMMUNITY
Start: 2017-04-04 | End: 1900-01-01

## 2017-04-18 ENCOUNTER — NON-APPOINTMENT (OUTPATIENT)
Age: 82
End: 2017-04-18

## 2017-04-18 ENCOUNTER — OTHER (OUTPATIENT)
Age: 82
End: 2017-04-18

## 2017-04-18 LAB
25(OH)D3 SERPL-MCNC: 26 NG/ML
ALBUMIN SERPL ELPH-MCNC: 3.8 G/DL
ALP BLD-CCNC: 105 U/L
ALT SERPL-CCNC: 24 U/L
ANION GAP SERPL CALC-SCNC: 20 MMOL/L
AST SERPL-CCNC: 35 U/L
BASOPHILS # BLD AUTO: 0.08 K/UL
BASOPHILS NFR BLD AUTO: 1.8 %
BILIRUB SERPL-MCNC: 0.3 MG/DL
BUN SERPL-MCNC: 41 MG/DL
CALCIUM SERPL-MCNC: 8.7 MG/DL
CHLORIDE SERPL-SCNC: 102 MMOL/L
CHOLEST SERPL-MCNC: 92 MG/DL
CHOLEST/HDLC SERPL: 2.5 RATIO
CO2 SERPL-SCNC: 21 MMOL/L
CREAT SERPL-MCNC: 1.99 MG/DL
EOSINOPHIL # BLD AUTO: 0.08 K/UL
EOSINOPHIL NFR BLD AUTO: 1.8 %
GLUCOSE SERPL-MCNC: 122 MG/DL
HBA1C MFR BLD HPLC: 5.8 %
HCT VFR BLD CALC: 26 %
HDLC SERPL-MCNC: 37 MG/DL
HGB BLD-MCNC: 7.8 G/DL
LDLC SERPL CALC-MCNC: 35 MG/DL
LYMPHOCYTES # BLD AUTO: 1.6 K/UL
LYMPHOCYTES NFR BLD AUTO: 33.9 %
MAN DIFF?: NORMAL
MCHC RBC-ENTMCNC: 29 PG
MCHC RBC-ENTMCNC: 30 GM/DL
MCV RBC AUTO: 96.7 FL
MONOCYTES # BLD AUTO: 0.34 K/UL
MONOCYTES NFR BLD AUTO: 7.1 %
NEUTROPHILS # BLD AUTO: 2.61 K/UL
NEUTROPHILS NFR BLD AUTO: 55.4 %
PLATELET # BLD AUTO: 162 K/UL
POTASSIUM SERPL-SCNC: 4.2 MMOL/L
PROT SERPL-MCNC: 6.3 G/DL
RBC # BLD: 2.69 M/UL
RBC # FLD: 15.6 %
SODIUM SERPL-SCNC: 143 MMOL/L
TRIGL SERPL-MCNC: 101 MG/DL
TSH SERPL-ACNC: 23.92 UIU/ML
WBC # FLD AUTO: 4.72 K/UL

## 2017-04-18 RX ORDER — FINASTERIDE 5 MG/1
5 TABLET, FILM COATED ORAL
Qty: 90 | Refills: 2 | Status: ACTIVE | OUTPATIENT
Start: 2017-04-04

## 2017-04-19 ENCOUNTER — APPOINTMENT (OUTPATIENT)
Dept: CARDIOLOGY | Facility: CLINIC | Age: 82
End: 2017-04-19

## 2017-04-19 DIAGNOSIS — K92.2 GASTROINTESTINAL HEMORRHAGE, UNSPECIFIED: ICD-10-CM

## 2017-04-19 DIAGNOSIS — K56.5 INTESTINAL ADHESIONS [BANDS] WITH OBSTRUCTION (POSTPROCEDURAL) (POSTINFECTION): ICD-10-CM

## 2017-04-19 DIAGNOSIS — F32.9 MAJOR DEPRESSIVE DISORDER, SINGLE EPISODE, UNSPECIFIED: ICD-10-CM

## 2017-04-19 DIAGNOSIS — S46.001S UNSPECIFIED INJURY OF MUSCLE(S) AND TENDON(S) OF THE ROTATOR CUFF OF RIGHT SHOULDER, SEQUELA: ICD-10-CM

## 2017-04-19 DIAGNOSIS — R97.20 ELEVATED PROSTATE, SPECIFIC ANTIGEN [PSA]: ICD-10-CM

## 2017-04-19 DIAGNOSIS — M10.9 GOUT, UNSPECIFIED: ICD-10-CM

## 2017-04-21 ENCOUNTER — LABORATORY RESULT (OUTPATIENT)
Age: 82
End: 2017-04-21

## 2017-04-21 LAB
BASOPHILS # BLD AUTO: 0.06 K/UL
BASOPHILS NFR BLD AUTO: 0.9 %
EOSINOPHIL # BLD AUTO: 0 K/UL
EOSINOPHIL NFR BLD AUTO: 0 %
HCT VFR BLD CALC: 26.1 %
HGB BLD-MCNC: 8.1 G/DL
INR PPP: 2.99 RATIO
LYMPHOCYTES # BLD AUTO: 1.28 K/UL
LYMPHOCYTES NFR BLD AUTO: 20.4 %
MAN DIFF?: NORMAL
MCHC RBC-ENTMCNC: 29 PG
MCHC RBC-ENTMCNC: 31 GM/DL
MCV RBC AUTO: 93.5 FL
MONOCYTES # BLD AUTO: 0.55 K/UL
MONOCYTES NFR BLD AUTO: 8.8 %
NEUTROPHILS # BLD AUTO: 4.4 K/UL
NEUTROPHILS NFR BLD AUTO: 69.9 %
PLATELET # BLD AUTO: 200 K/UL
PT BLD: 34.5 SEC
RBC # BLD: 2.79 M/UL
RBC # FLD: 15.2 %
WBC # FLD AUTO: 6.29 K/UL

## 2017-04-25 LAB
INR PPP: 2.48 RATIO
PT BLD: 28.6 SEC

## 2017-04-27 ENCOUNTER — MEDICATION RENEWAL (OUTPATIENT)
Age: 82
End: 2017-04-27

## 2017-04-28 ENCOUNTER — APPOINTMENT (OUTPATIENT)
Dept: CARDIOLOGY | Facility: CLINIC | Age: 82
End: 2017-04-28

## 2017-04-28 ENCOUNTER — NON-APPOINTMENT (OUTPATIENT)
Age: 82
End: 2017-04-28

## 2017-04-28 VITALS — TEMPERATURE: 97.3 F | OXYGEN SATURATION: 100 % | HEART RATE: 128 BPM

## 2017-04-28 VITALS — SYSTOLIC BLOOD PRESSURE: 84 MMHG | DIASTOLIC BLOOD PRESSURE: 60 MMHG

## 2017-04-28 DIAGNOSIS — I35.0 NONRHEUMATIC AORTIC (VALVE) STENOSIS: ICD-10-CM

## 2017-04-28 DIAGNOSIS — E03.9 HYPOTHYROIDISM, UNSPECIFIED: ICD-10-CM

## 2017-04-28 DIAGNOSIS — Z51.5 ENCOUNTER FOR PALLIATIVE CARE: ICD-10-CM

## 2017-04-28 DIAGNOSIS — I25.10 ATHEROSCLEROTIC HEART DISEASE OF NATIVE CORONARY ARTERY W/OUT ANGINA PECTORIS: ICD-10-CM

## 2017-04-28 DIAGNOSIS — E78.00 PURE HYPERCHOLESTEROLEMIA, UNSPECIFIED: ICD-10-CM

## 2017-05-01 ENCOUNTER — MEDICATION RENEWAL (OUTPATIENT)
Age: 82
End: 2017-05-01

## 2017-05-01 DIAGNOSIS — Z00.00 ENCOUNTER FOR GENERAL ADULT MEDICAL EXAMINATION W/OUT ABNORMAL FINDINGS: ICD-10-CM

## 2017-05-01 DIAGNOSIS — I51.9 HEART DISEASE, UNSPECIFIED: ICD-10-CM

## 2017-05-01 DIAGNOSIS — N28.9 DISORDER OF KIDNEY AND URETER, UNSPECIFIED: ICD-10-CM

## 2017-05-01 DIAGNOSIS — M79.89 OTHER SPECIFIED SOFT TISSUE DISORDERS: ICD-10-CM

## 2017-05-01 DIAGNOSIS — I10 ESSENTIAL (PRIMARY) HYPERTENSION: ICD-10-CM

## 2017-05-01 DIAGNOSIS — I48.91 UNSPECIFIED ATRIAL FIBRILLATION: ICD-10-CM

## 2017-05-01 RX ORDER — PANTOPRAZOLE 40 MG/1
40 TABLET, DELAYED RELEASE ORAL
Qty: 180 | Refills: 3 | Status: ACTIVE | COMMUNITY
Start: 2017-05-01 | End: 1900-01-01

## 2017-05-01 RX ORDER — PANTOPRAZOLE SODIUM 40 MG/1
40 GRANULE, DELAYED RELEASE ORAL
Qty: 20 | Refills: 2 | Status: DISCONTINUED | COMMUNITY
Start: 2017-04-04 | End: 2017-05-01

## 2017-05-03 ENCOUNTER — CHART COPY (OUTPATIENT)
Age: 82
End: 2017-05-03

## 2017-05-03 LAB
ALBUMIN SERPL ELPH-MCNC: 3.5 G/DL
ALP BLD-CCNC: 107 U/L
ALT SERPL-CCNC: 317 U/L
ANION GAP SERPL CALC-SCNC: 26 MMOL/L
AST SERPL-CCNC: 255 U/L
BASOPHILS # BLD AUTO: 0 K/UL
BASOPHILS NFR BLD AUTO: 0 %
BILIRUB SERPL-MCNC: 0.7 MG/DL
BUN SERPL-MCNC: 116 MG/DL
CALCIUM SERPL-MCNC: 8.4 MG/DL
CHLORIDE SERPL-SCNC: 100 MMOL/L
CO2 SERPL-SCNC: 18 MMOL/L
CREAT SERPL-MCNC: 3.7 MG/DL
EOSINOPHIL # BLD AUTO: 0.01 K/UL
EOSINOPHIL NFR BLD AUTO: 0.1 %
GLUCOSE SERPL-MCNC: 95 MG/DL
HCT VFR BLD CALC: 27.9 %
HGB BLD-MCNC: 8.2 G/DL
IMM GRANULOCYTES NFR BLD AUTO: 0.1 %
INR PPP: 2.4 RATIO
LYMPHOCYTES # BLD AUTO: 1.26 K/UL
LYMPHOCYTES NFR BLD AUTO: 13.7 %
MAN DIFF?: NORMAL
MCHC RBC-ENTMCNC: 27.2 PG
MCHC RBC-ENTMCNC: 29.4 GM/DL
MCV RBC AUTO: 92.4 FL
MONOCYTES # BLD AUTO: 1.07 K/UL
MONOCYTES NFR BLD AUTO: 11.6 %
NEUTROPHILS # BLD AUTO: 6.87 K/UL
NEUTROPHILS NFR BLD AUTO: 74.5 %
PLATELET # BLD AUTO: 165 K/UL
POTASSIUM SERPL-SCNC: 3.8 MMOL/L
PROT SERPL-MCNC: 5.9 G/DL
PT BLD: 27.6 SEC
RBC # BLD: 3.02 M/UL
RBC # FLD: 16.1 %
SODIUM SERPL-SCNC: 144 MMOL/L
WBC # FLD AUTO: 9.22 K/UL

## 2017-05-05 ENCOUNTER — MEDICATION RENEWAL (OUTPATIENT)
Age: 82
End: 2017-05-05

## 2017-05-05 RX ORDER — METOPROLOL SUCCINATE 25 MG/1
25 TABLET, EXTENDED RELEASE ORAL DAILY
Qty: 90 | Refills: 3 | Status: ACTIVE | COMMUNITY
Start: 2017-04-04 | End: 1900-01-01

## 2017-05-05 RX ORDER — TORSEMIDE 10 MG/1
10 TABLET ORAL TWICE DAILY
Qty: 240 | Refills: 1 | Status: ACTIVE | COMMUNITY
Start: 2017-05-01 | End: 1900-01-01

## 2017-05-08 ENCOUNTER — RESULT REVIEW (OUTPATIENT)
Age: 82
End: 2017-05-08

## 2017-05-09 LAB
INR PPP: 3.8
PT BLD: 46.5

## 2020-02-25 NOTE — DISCHARGE NOTE ADULT - INSTRUCTIONS
DASH diet - low salt, low fat Unna Boot Text: An Unna boot was placed to help immobilize the limb and facilitate more rapid healing. Take medicine as instructed, follow up with your doctors regularly. Watch for bleeding and if any report to the doctor. Notify the doctor if any change in status.

## 2020-12-15 PROBLEM — Z87.09 HISTORY OF ACUTE BRONCHITIS: Status: RESOLVED | Noted: 2017-01-06 | Resolved: 2020-12-15

## 2021-03-29 NOTE — DISCHARGE NOTE ADULT - REASON FOR ADMISSION
#[phone] [facility] needs a call pertaining to the Bumex. They need a call back today as pt is out of medication and they have a question prior to filling. body swelling, generalized weakness

## 2021-09-14 NOTE — PATIENT PROFILE ADULT. - NS PRO OT REFERRAL QUES 2 YN
1. LTG Pt will be indep to neg 1 flt of stairs w/in 2 wks/bed mobility training/gait training/transfer training
no

## 2023-11-21 NOTE — H&P ADULT. - EXTREMITIES
Progress Note - Darshan Mckeon 80 y.o. male MRN: [id number]    Unit/Bed#: [facility] 404-01 Encounter: [id number]      Assessment/Plan:  Encephalopathy  Improved, appears to be at baseline  At risk for delirium secondary to hospitalization, insomnia, constipation, hypoxia  Continue supplemental oxygenation, CPAP QHS  Urinary retention  Maintain delirium precautions:  Provide frequent redirection, reorientation, distraction techniques  Avoid deliriogenic medications such as tramadol, benzodiazepines, anticholinergics,  Benadryl  Treat pain, See geriatric pain protocol  Monitor for constipation and urinary retention  Encourage early and frequent moblization, OOB  Encourage Hydration/ Nutrition  Implement sleep hygiene, limit night time interuptions, group activities     Mild cognitive impairment with memory loss  MMSE 27/30 (2017)  TSH 0.860 (11/12/230  Vitamin B 12 level 353 (11/11/23) goal level greater than 400  CT head (11/12/23): Mild periventricular hypoattenuation is suggestive of chronic microangiopathic disease. 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 Chronic bilateral thalamic lacunar infarctions. Bilateral intracranial carotid and vertebral artery atherosclerotic calcifications.      FILOMENA  With chronic use of CPAP at home  Continue home CPAP QHS     Acute respiratory failure  Multifactorial:COPD, acute on chronic HF, possible lung mass on prior imaging  Continue supplemental oxygen  Completed IV abx x 3 days  IV bumex  Pulmonary was consulted, now signed off     Insomnia  Chronic  Continue CPAP  Melatonin QHS prn  Encourage daytime activity     BPH/urinary retention  Urinary retention protocol  Maintain home proscar  Last BM 11/20/22     Ambulatory dysfunction  Fall precautions  PT/OT  Rehab post hospitalization  Encourage mobilization, oob     Frailty  Clinical Frail Scale: 6- Moderately Frail   Albumin 3.7, recommend protein supplementation  Lives at [facility] supportive daughters  PT/OT     Subjective:   He is oob in recliner chair. He is alert, talkative, calm, cooperative, He is working on a rubic cube puzzle. (He almost has 1 side completed)    Objective:     Vitals: Blood pressure 140/76, pulse 68, temperature (!) 97.4 °F (36.3 °C), resp. rate 20, height 5' 8.5" (1.74 m), weight 108 kg (238 lb 12.1 oz), SpO2 94 %. ,Body mass index is 35.77 kg/m². Intake/Output Summary (Last 24 hours) at 11/21/2023 1024  Last data filed at 11/21/2023 1007  Gross per 24 hour   Intake 300 ml   Output 1300 ml   Net -1000 ml       Physical Exam:   General : NAD  HEENT : MMM   Heart : Normal rate, no murmur rub or gallop  Lungs : CTA no wheezes, rales or rhonchi  Abdomen : Soft, NT/ND, BS auscultated in all 4 quads.    Ext :  no edema  Skin : Pink, warm, dry, age appropriate turgor and mobility  Neuro : Nonfocal  Psych : Alert and O x 3    Invasive Devices       None                   Lab, Imaging and other studies: I have personally reviewed pertinent films in PACS  VTE Pharmacologic Prophylaxis: Sequential compression device (Venodyne) eliquis  VTE Mechanical Prophylaxis: sequential compression device detailed exam

## 2024-05-14 NOTE — ED ADULT NURSE NOTE - FALL HARM RISK TYPE OF ASSESSMENT
Patient ID: Bdudy Izquierdo is a 61 y.o. male.    Procedures  Procedure: Intracavernosal injection   Penile ultrasound/gray scale   Penile duplex Doppler ultrasound     Indication: Erectile dysfunction refractory to PDE5 inhibitors / Peyronies Disease    Penile Ultrasound: Morphologic and gray scale evaluation of the penis     1.- Tunical integrity:    Normal  Curvature to the right about 45 degree.    2.- Vascular integrity:     Grade 3.: ectatic walls with multiple long luminal obstructions     3.- Erectile tissue integrity:   Diffuse calcification     Medication:     20 units of mixture # 5      Penile rigidity:   %   Penile curvature: none   Phenylephrine: none     Penile duplex Doppler ultrasound:             Right   Left   Cavernosal artery diameters:     0.49 mm               0.27 mm   Peak systolic velocities:   46.3cm/sec    33 cm/sec    End diastolic velocities:   10.7 cm/sec   8.91  cm/sec       Impression: Severe erectile dysfunction secondary to arterial insufficiency and corporo veno-occlusive dysfunction refractory to medical management with PDE's (Viagra, Cialis) and intracavernosal therapy     Peyronie's disease     Plan: Penile prosthesis, the risks and benefits were explained in detail and educational material was provided.        The patient was also counseled extensively that today or while on injections If he develops a priapism / erection over 4 hours he was ask to return to the office or to the emergency room immediately. Educational material was provided and all his questions and concerns were addressed.      Scribe Attestation  By signing my name below, Loan HUYNH Scribe, attilya that this documentation  has been prepared under the direction and in the presence of William Pearson MD.      Scribe Attestation  By signing my name below, Raissa HUYNH Scribe attest that this documentation has been prepared under the direction and in the presence of William  MD Lili. All medical record entries made by the Scribe were at my direction or personally dictated by me. I have reviewed the chart and agree that the record accurately reflects my personal performance of the history, physical exam, discussion and plan.     Admission